# Patient Record
Sex: FEMALE | Race: WHITE | NOT HISPANIC OR LATINO | Employment: FULL TIME | URBAN - METROPOLITAN AREA
[De-identification: names, ages, dates, MRNs, and addresses within clinical notes are randomized per-mention and may not be internally consistent; named-entity substitution may affect disease eponyms.]

---

## 2018-07-03 ENCOUNTER — OFFICE VISIT (OUTPATIENT)
Dept: FAMILY MEDICINE | Facility: CLINIC | Age: 28
End: 2018-07-03
Payer: COMMERCIAL

## 2018-07-03 VITALS
OXYGEN SATURATION: 99 % | WEIGHT: 179 LBS | HEART RATE: 87 BPM | DIASTOLIC BLOOD PRESSURE: 76 MMHG | HEIGHT: 69 IN | SYSTOLIC BLOOD PRESSURE: 122 MMHG | BODY MASS INDEX: 26.51 KG/M2 | TEMPERATURE: 98.5 F

## 2018-07-03 DIAGNOSIS — Z12.4 CERVICAL CANCER SCREENING: ICD-10-CM

## 2018-07-03 DIAGNOSIS — R35.0 URINARY FREQUENCY: ICD-10-CM

## 2018-07-03 DIAGNOSIS — Z00.01 ENCOUNTER FOR PREVENTATIVE ADULT HEALTH CARE EXAM WITH ABNORMAL FINDINGS: Primary | ICD-10-CM

## 2018-07-03 DIAGNOSIS — B49 FUNGAL INFECTION: ICD-10-CM

## 2018-07-03 DIAGNOSIS — Z30.011 ENCOUNTER FOR INITIAL PRESCRIPTION OF CONTRACEPTIVE PILLS: ICD-10-CM

## 2018-07-03 DIAGNOSIS — R10.13 EPIGASTRIC PAIN: ICD-10-CM

## 2018-07-03 DIAGNOSIS — L30.9 ECZEMA, UNSPECIFIED TYPE: ICD-10-CM

## 2018-07-03 LAB
ALBUMIN SERPL-MCNC: 3.5 G/DL (ref 3.4–5)
ALBUMIN UR-MCNC: NEGATIVE MG/DL
ALP SERPL-CCNC: 79 U/L (ref 40–150)
ALT SERPL W P-5'-P-CCNC: 22 U/L (ref 0–50)
ANION GAP SERPL CALCULATED.3IONS-SCNC: 9 MMOL/L (ref 3–14)
APPEARANCE UR: CLEAR
AST SERPL W P-5'-P-CCNC: 21 U/L (ref 0–45)
BILIRUB SERPL-MCNC: 0.2 MG/DL (ref 0.2–1.3)
BILIRUB UR QL STRIP: NEGATIVE
BUN SERPL-MCNC: 10 MG/DL (ref 7–30)
CALCIUM SERPL-MCNC: 9 MG/DL (ref 8.5–10.1)
CHLORIDE SERPL-SCNC: 105 MMOL/L (ref 94–109)
CO2 SERPL-SCNC: 26 MMOL/L (ref 20–32)
COLOR UR AUTO: YELLOW
CREAT SERPL-MCNC: 0.67 MG/DL (ref 0.52–1.04)
ERYTHROCYTE [DISTWIDTH] IN BLOOD BY AUTOMATED COUNT: 12.3 % (ref 10–15)
GFR SERPL CREATININE-BSD FRML MDRD: >90 ML/MIN/1.7M2
GLUCOSE SERPL-MCNC: 75 MG/DL (ref 70–99)
GLUCOSE UR STRIP-MCNC: NEGATIVE MG/DL
HBA1C MFR BLD: 5.1 % (ref 0–5.6)
HCT VFR BLD AUTO: 40.3 % (ref 35–47)
HGB BLD-MCNC: 13.7 G/DL (ref 11.7–15.7)
HGB UR QL STRIP: NEGATIVE
KETONES UR STRIP-MCNC: NEGATIVE MG/DL
LEUKOCYTE ESTERASE UR QL STRIP: NEGATIVE
LIPASE SERPL-CCNC: 129 U/L (ref 73–393)
MCH RBC QN AUTO: 29.5 PG (ref 26.5–33)
MCHC RBC AUTO-ENTMCNC: 34 G/DL (ref 31.5–36.5)
MCV RBC AUTO: 87 FL (ref 78–100)
NITRATE UR QL: NEGATIVE
PH UR STRIP: 7 PH (ref 5–7)
PLATELET # BLD AUTO: 339 10E9/L (ref 150–450)
POTASSIUM SERPL-SCNC: 3.9 MMOL/L (ref 3.4–5.3)
PROT SERPL-MCNC: 7.8 G/DL (ref 6.8–8.8)
RBC # BLD AUTO: 4.64 10E12/L (ref 3.8–5.2)
SODIUM SERPL-SCNC: 140 MMOL/L (ref 133–144)
SOURCE: NORMAL
SP GR UR STRIP: <=1.005 (ref 1–1.03)
UROBILINOGEN UR STRIP-ACNC: 0.2 EU/DL (ref 0.2–1)
WBC # BLD AUTO: 5.9 10E9/L (ref 4–11)

## 2018-07-03 PROCEDURE — 83690 ASSAY OF LIPASE: CPT | Performed by: NURSE PRACTITIONER

## 2018-07-03 PROCEDURE — 99213 OFFICE O/P EST LOW 20 MIN: CPT | Mod: 25 | Performed by: NURSE PRACTITIONER

## 2018-07-03 PROCEDURE — 99385 PREV VISIT NEW AGE 18-39: CPT | Performed by: NURSE PRACTITIONER

## 2018-07-03 PROCEDURE — 85027 COMPLETE CBC AUTOMATED: CPT | Performed by: NURSE PRACTITIONER

## 2018-07-03 PROCEDURE — 80053 COMPREHEN METABOLIC PANEL: CPT | Performed by: NURSE PRACTITIONER

## 2018-07-03 PROCEDURE — G0145 SCR C/V CYTO,THINLAYER,RESCR: HCPCS | Performed by: NURSE PRACTITIONER

## 2018-07-03 PROCEDURE — 81003 URINALYSIS AUTO W/O SCOPE: CPT | Performed by: NURSE PRACTITIONER

## 2018-07-03 PROCEDURE — 83036 HEMOGLOBIN GLYCOSYLATED A1C: CPT | Performed by: NURSE PRACTITIONER

## 2018-07-03 PROCEDURE — 36415 COLL VENOUS BLD VENIPUNCTURE: CPT | Performed by: NURSE PRACTITIONER

## 2018-07-03 RX ORDER — KETOCONAZOLE 20 MG/G
CREAM TOPICAL DAILY
Qty: 60 G | Refills: 1 | Status: SHIPPED | OUTPATIENT
Start: 2018-07-03 | End: 2019-10-21

## 2018-07-03 RX ORDER — KETOCONAZOLE 20 MG/G
CREAM TOPICAL DAILY
COMMUNITY
End: 2018-07-03

## 2018-07-03 RX ORDER — CLOTRIMAZOLE AND BETAMETHASONE DIPROPIONATE 10; .64 MG/G; MG/G
CREAM TOPICAL 2 TIMES DAILY
Qty: 30 G | Refills: 1 | Status: SHIPPED | OUTPATIENT
Start: 2018-07-03 | End: 2019-10-21

## 2018-07-03 RX ORDER — NORGESTIMATE AND ETHINYL ESTRADIOL 0.25-0.035
1 KIT ORAL DAILY
Qty: 84 TABLET | Refills: 3 | Status: SHIPPED | OUTPATIENT
Start: 2018-07-03 | End: 2019-06-06

## 2018-07-03 RX ORDER — NORGESTIMATE AND ETHINYL ESTRADIOL 0.25-0.035
1 KIT ORAL DAILY
COMMUNITY
End: 2018-07-03

## 2018-07-03 ASSESSMENT — PAIN SCALES - GENERAL: PAINLEVEL: NO PAIN (0)

## 2018-07-03 NOTE — PROGRESS NOTES
SUBJECTIVE:   CC: Adina Clark is an 28 year old woman who presents for preventive health visit.     Physical   Annual:     Getting at least 3 servings of Calcium per day:  Yes    Bi-annual eye exam:  NO    Dental care twice a year:  NO    Sleep apnea or symptoms of sleep apnea:  None    Diet:  Regular (no restrictions)    Frequency of exercise:  2-3 days/week    Duration of exercise:  30-45 minutes    Taking medications regularly:  Yes    Medication side effects:  None    Additional concerns today:  YES    She left urine sample  Denies dysuria  Intermittent frequency. Sometimes with urgency  Denies polydipsia    Sharp pain in epigastric area  Feels full  Denies heart burn, indigestion, change to BM  Rare alcohol use  Not always worsened by oral intake  Denies weight loss, nausea, vomiting    She is interested in other forms of birth control, IUD  She is in a monogamous relationship with 1 partner of several years  Declines STD screening  No history PID, cervicitis    Today's PHQ-2 Score:   PHQ-2 ( 1999 [employer]) 7/3/2018   Q1: Little interest or pleasure in doing things 0   Q2: Feeling down, depressed or hopeless 0   PHQ-2 Score 0   Q1: Little interest or pleasure in doing things Not at all   Q2: Feeling down, depressed or hopeless Not at all   PHQ-2 Score 0       Abuse: Current or Past(Physical, Sexual or Emotional)- No  Do you feel safe in your environment - Yes    Social History   Substance Use Topics     Smoking status: Never Smoker     Smokeless tobacco: Never Used     Alcohol use Yes      Comment: occ.     Alcohol Use 7/3/2018   If you drink alcohol do you typically have greater than 3 drinks per day OR greater than 7 drinks per week? No       Reviewed orders with patient.  Reviewed health maintenance and updated orders accordingly - Yes  Labs reviewed in EPIC  BP Readings from Last 3 Encounters:   07/03/18 122/76    Wt Readings from Last 3 Encounters:   07/03/18 179 lb (81.2 kg)                 "  There is no problem list on file for this patient.    History reviewed. No pertinent surgical history.    Social History   Substance Use Topics     Smoking status: Never Smoker     Smokeless tobacco: Never Used     Alcohol use Yes      Comment: occ.     Family History   Problem Relation Age of Onset     Hypertension Mother            Mammogram not appropriate for this patient based on age.    Pertinent mammograms are reviewed under the imaging tab.  History of abnormal Pap smear: NO - age 21-29 PAP every 3 years recommended     Reviewed and updated as needed this visit by clinical staff  Tobacco  Allergies  Meds  Med Hx  Surg Hx  Fam Hx  Soc Hx        Reviewed and updated as needed this visit by Provider        History reviewed. No pertinent past medical history.     Review of Systems  CONSTITUTIONAL: NEGATIVE for fever, chills, change in weight  INTEGUMENTARU/SKIN: NEGATIVE for worrisome rashes, moles or lesions  EYES: NEGATIVE for vision changes or irritation  ENT: NEGATIVE for ear, mouth and throat problems  RESP: NEGATIVE for significant cough or SOB  BREAST: NEGATIVE for masses, tenderness or discharge  CV: NEGATIVE for chest pain, palpitations or peripheral edema  GI: see HPI  : NEGATIVE for unusual urinary or vaginal symptoms. Periods are regular.  MUSCULOSKELETAL: NEGATIVE for significant arthralgias or myalgia  NEURO: NEGATIVE for weakness, dizziness or paresthesias  PSYCHIATRIC: NEGATIVE for changes in mood or affect     OBJECTIVE:   /76 (BP Location: Right arm, Patient Position: Chair, Cuff Size: Adult Regular)  Pulse 87  Temp 98.5  F (36.9  C) (Oral)  Ht 5' 8.5\" (1.74 m)  Wt 179 lb (81.2 kg)  LMP 06/05/2018  SpO2 99%  Breastfeeding? No  BMI 26.82 kg/m2  Physical Exam  GENERAL: healthy, alert and no distress  EYES: Eyes grossly normal to inspection, PERRL and conjunctivae and sclerae normal  HENT: ear canals and TM's normal, nose and mouth without ulcers or lesions  NECK: no " adenopathy, no asymmetry, masses, or scars and thyroid normal to palpation  RESP: lungs clear to auscultation - no rales, rhonchi or wheezes  BREAST: normal without masses, tenderness or nipple discharge and no palpable axillary masses or adenopathy  CV: regular rate and rhythm, normal S1 S2, no S3 or S4, no murmur, click or rub, no peripheral edema and peripheral pulses strong  ABDOMEN: soft, nontender, no hepatosplenomegaly, no masses and bowel sounds normal   (female): normal female external genitalia, normal urethral meatus, vaginal mucosa pink, moist, well rugated, and normal cervix/adnexa/uterus without masses or discharge  MS: no gross musculoskeletal defects noted, no edema  SKIN: 2 annual erythematous lesions with hyperpigmented borders right breast  NEURO: Normal strength and tone, mentation intact and speech normal  PSYCH: mentation appears normal, affect normal/bright    Diagnostic Test Results:  Results for orders placed or performed in visit on 07/03/18 (from the past 24 hour(s))   UA reflex to Microscopic and Culture   Result Value Ref Range    Color Urine Yellow     Appearance Urine Clear     Glucose Urine Negative NEG^Negative mg/dL    Bilirubin Urine Negative NEG^Negative    Ketones Urine Negative NEG^Negative mg/dL    Specific Gravity Urine <=1.005 1.003 - 1.035    Blood Urine Negative NEG^Negative    pH Urine 7.0 5.0 - 7.0 pH    Protein Albumin Urine Negative NEG^Negative mg/dL    Urobilinogen Urine 0.2 0.2 - 1.0 EU/dL    Nitrite Urine Negative NEG^Negative    Leukocyte Esterase Urine Negative NEG^Negative    Source Midstream Urine    CBC with platelets   Result Value Ref Range    WBC 5.9 4.0 - 11.0 10e9/L    RBC Count 4.64 3.8 - 5.2 10e12/L    Hemoglobin 13.7 11.7 - 15.7 g/dL    Hematocrit 40.3 35.0 - 47.0 %    MCV 87 78 - 100 fl    MCH 29.5 26.5 - 33.0 pg    MCHC 34.0 31.5 - 36.5 g/dL    RDW 12.3 10.0 - 15.0 %    Platelet Count 339 150 - 450 10e9/L   Hemoglobin A1c   Result Value Ref Range     "Hemoglobin A1C 5.1 0 - 5.6 %       ASSESSMENT/PLAN:   1. Encounter for preventative adult health care exam with abnormal findings    2. Urinary frequency  - Not typical symptoms of infection.  Consider diabetes, diuretic effect from coffee  - UA reflex to Microscopic and Culture  - Hemoglobin A1c    3. Epigastric pain  - Physical exam unremarkable. VSS. Follow up pending lab work  - Comprehensive metabolic panel (BMP + Alb, Alk Phos, ALT, AST, Total. Bili, TP)  - CBC with platelets  - Lipase  - H Pylori antigen, stool; Future    4. Cervical cancer screening  - PAP imaged thin layer screen    5. Fungal infection  - clotrimazole-betamethasone (LOTRISONE) cream; Apply topically 2 times daily  Dispense: 30 g; Refill: 1    6. Eczema, unspecified type  - Refill per patient request  - ketoconazole (NIZORAL) 2 % cream; Apply topically daily  Dispense: 60 g; Refill: 1    7. Encounter for initial prescription of contraceptive pills  - We discussed LARCs including risks and benefits. Advised that cramping and irregular spotting are common after insertion but after 6 months most women have menorrhea. IUD would be category 1 for her. Refilled COCs and advised to schedule with Dr. Engelmann for insertion. I also gave her additional materials on birth control options  - norgestimate-ethinyl estradiol (ORTHO-CYCLEN, SPRINTEC) 0.25-35 MG-MCG per tablet; Take 1 tablet by mouth daily  Dispense: 84 tablet; Refill: 3    COUNSELING:  Reviewed preventive health counseling, as reflected in patient instructions       Regular exercise       Healthy diet/nutrition       Contraception       Family planning    BP Readings from Last 1 Encounters:   07/03/18 122/76     Estimated body mass index is 26.82 kg/(m^2) as calculated from the following:    Height as of this encounter: 5' 8.5\" (1.74 m).    Weight as of this encounter: 179 lb (81.2 kg).           reports that she has never smoked. She has never used smokeless tobacco.      Counseling " Resources:  ATP IV Guidelines  Pooled Cohorts Equation Calculator  Breast Cancer Risk Calculator  FRAX Risk Assessment  ICSI Preventive Guidelines  Dietary Guidelines for Americans, 2010  USDA's MyPlate  ASA Prophylaxis  Lung CA Screening    SHARRON Buenrostro CNP  [facility]  Answers for HPI/ROS submitted by the patient on 7/3/2018   PHQ-2 Score: 0

## 2018-07-03 NOTE — MR AVS SNAPSHOT
After Visit Summary   7/3/2018    Adina Clark    MRN: [id number]           Patient Information     Date Of Birth          1990        Visit Information        Provider Department      7/3/2018 8:40 AM Ca Spence APRN [facility]        Today's Diagnoses     Encounter for preventative adult health care exam with abnormal findings    -  1    Urinary frequency        Epigastric pain        Cervical cancer screening        Fungal infection        Eczema, unspecified type        Encounter for initial prescription of contraceptive pills          Care Instructions    You can look into Nexplanon and Mirena IUD online  If you are interested in either of these, you can schedule with Dr. Engelmann for insertion  We also have midwives that come on Thursday and you could schedule with them    Preventive Health Recommendations  Female Ages 26 - 39  Yearly exam:   See your health care provider every year in order to    Review health changes.     Discuss preventive care.      Review your medicines if you your doctor has prescribed any.    Until age 30: Get a Pap test every three years (more often if you have had an abnormal result).    After age 30: Talk to your doctor about whether you should have a Pap test every 3 years or have a Pap test with HPV screening every 5 years.   You do not need a Pap test if your uterus was removed (hysterectomy) and you have not had cancer.  You should be tested each year for STDs (sexually transmitted diseases), if you're at risk.   Talk to your provider about how often to have your cholesterol checked.  If you are at risk for diabetes, you should have a diabetes test (fasting glucose).  Shots: Get a flu shot each year. Get a tetanus shot every 10 years.   Nutrition:     Eat at least 5 servings of fruits and vegetables each day.    Eat whole-grain bread, whole-wheat pasta and brown rice instead of white grains and rice.    Get  "adequate Calcium and Vitamin D.     Lifestyle    Exercise at least 150 minutes a week (30 minutes a day, 5 days of the week). This will help you control your weight and prevent disease.    Limit alcohol to one drink per day.    No smoking.     Wear sunscreen to prevent skin cancer.    See your dentist every six months for an exam and cleaning.            Follow-ups after your visit        Future tests that were ordered for you today     Open Future Orders        Priority Expected Expires Ordered    H Pylori antigen, stool Routine  8/2/2018 7/3/2018            Who to contact     If you have questions or need follow up information about today's clinic visit or your schedule please contact [facility] directly at [phone].  Normal or non-critical lab and imaging results will be communicated to you by MyChart, letter or phone within 4 business days after the clinic has received the results. If you do not hear from us within 7 days, please contact the clinic through [employer] or phone. If you have a critical or abnormal lab result, we will notify you by phone as soon as possible.  Submit refill requests through [employer] or call your pharmacy and they will forward the refill request to us. Please allow 3 business days for your refill to be completed.          Additional Information About Your Visit        [employer] Information     [employer] lets you send messages to your doctor, view your test results, renew your prescriptions, schedule appointments and more. To sign up, go to [email or web address] . Click on \"Log in\" on the left side of the screen, which will take you to the Welcome page. Then click on \"Sign up Now\" on the right side of the page.     You will be asked to enter the access code listed below, as well as some personal information. Please follow the directions to create your username and password.     Your access code is: [email or web address]  Expires: 10/1/2018  8:33 AM     Your access code will " " in 90 days. If you need help or a new code, please call your [facility] or [phone].        Care EveryWhere ID     This is your Care EveryWhere ID. This could be used by other organizations to access your [place] medical records  [id number]        Your Vitals Were     Pulse Temperature Height Last Period Pulse Oximetry Breastfeeding?    87 98.5  F (36.9  C) (Oral) 5' 8.5\" (1.74 m) 2018 99% No    BMI (Body Mass Index)                   26.82 kg/m2            Blood Pressure from Last 3 Encounters:   18 122/76    Weight from Last 3 Encounters:   18 179 lb (81.2 kg)              We Performed the Following     CBC with platelets     Comprehensive metabolic panel (BMP + Alb, Alk Phos, ALT, AST, Total. Bili, TP)     Hemoglobin A1c     Lipase     PAP imaged thin layer screen     UA reflex to Microscopic and Culture          Today's Medication Changes          These changes are accurate as of 7/3/18  9:21 AM.  If you have any questions, ask your nurse or doctor.               Start taking these medicines.        Dose/Directions    clotrimazole-betamethasone cream   Commonly known as:  LOTRISONE   Used for:  Fungal infection   Started by:  Ca Spence APRN CNP        Apply topically 2 times daily   Quantity:  30 g   Refills:  1            Where to get your medicines      These medications were sent to [facility] [address] [place], [place] - [address]  [address] [address]     Phone:  [phone]     clotrimazole-betamethasone cream    ketoconazole 2 % cream    norgestimate-ethinyl estradiol 0.25-35 MG-MCG per tablet                Primary Care Provider Office Phone # Fax #    [facility] [phone]       [address] [place] [address]        Equal Access to Services     SHARYN RICE AH: Rajesh Colon, kae green, qatash arreola, ada brandt " letitia salinas ah. So [facility] [phone].    ATENCIÓN: Si habla rona, tiene a doss disposición servicios gratuitos de asistencia lingüística. [place] calixto [phone].    We comply with applicable federal civil rights laws and [place] laws. We do not discriminate on the basis of race, color, national origin, age, disability, sex, sexual orientation, or gender identity.            Thank you!     Thank you for choosing [facility]  for your care. Our goal is always to provide you with excellent care. Hearing back from our patients is one way we can continue to improve our services. Please take a few minutes to complete the written survey that you may receive in the mail after your visit with us. Thank you!             Your Updated Medication List - Protect others around you: Learn how to safely use, store and throw away your medicines at www.disposemymeds.org.          This list is accurate as of 7/3/18  9:21 AM.  Always use your most recent med list.                   Brand Name Dispense Instructions for use Diagnosis    clotrimazole-betamethasone cream    LOTRISONE    30 g    Apply topically 2 times daily    Fungal infection       ketoconazole 2 % cream    NIZORAL    60 g    Apply topically daily    Eczema, unspecified type       norgestimate-ethinyl estradiol 0.25-35 MG-MCG per tablet    ORTHO-CYCLEN, SPRINTEC    84 tablet    Take 1 tablet by mouth daily    Encounter for initial prescription of contraceptive pills

## 2018-07-03 NOTE — PATIENT INSTRUCTIONS
You can look into Nexplanon and Mirena IUD online  If you are interested in either of these, you can schedule with Dr. Engelmann for insertion  We also have midwives that come on Thursday and you could schedule with them    Preventive Health Recommendations  Female Ages 26 - 39  Yearly exam:   See your health care provider every year in order to    Review health changes.     Discuss preventive care.      Review your medicines if you your doctor has prescribed any.    Until age 30: Get a Pap test every three years (more often if you have had an abnormal result).    After age 30: Talk to your doctor about whether you should have a Pap test every 3 years or have a Pap test with HPV screening every 5 years.   You do not need a Pap test if your uterus was removed (hysterectomy) and you have not had cancer.  You should be tested each year for STDs (sexually transmitted diseases), if you're at risk.   Talk to your provider about how often to have your cholesterol checked.  If you are at risk for diabetes, you should have a diabetes test (fasting glucose).  Shots: Get a flu shot each year. Get a tetanus shot every 10 years.   Nutrition:     Eat at least 5 servings of fruits and vegetables each day.    Eat whole-grain bread, whole-wheat pasta and brown rice instead of white grains and rice.    Get adequate Calcium and Vitamin D.     Lifestyle    Exercise at least 150 minutes a week (30 minutes a day, 5 days of the week). This will help you control your weight and prevent disease.    Limit alcohol to one drink per day.    No smoking.     Wear sunscreen to prevent skin cancer.    See your dentist every six months for an exam and cleaning.

## 2018-07-05 DIAGNOSIS — R10.13 EPIGASTRIC PAIN: ICD-10-CM

## 2018-07-05 PROCEDURE — 87338 HPYLORI STOOL AG IA: CPT | Performed by: NURSE PRACTITIONER

## 2018-07-06 LAB
COPATH REPORT: NORMAL
PAP: NORMAL

## 2018-07-09 LAB
H PYLORI AG STL QL IA: NORMAL
SPECIMEN SOURCE: NORMAL

## 2018-07-10 NOTE — PROGRESS NOTES
Adina Clark,    Your lab results have been released to [employer].   Testing for H pylori was negative.   Please call the clinic if you have any concerns [phone].    SHARRON Buenrostro [facility]

## 2018-07-16 NOTE — PROGRESS NOTES
Adina Clark,    Your lab results have been released to [employer].   Your labs look good.   Please call the clinic if you have any concerns [phone].    SHARRON Buenrostro [facility]

## 2019-03-01 ENCOUNTER — TELEPHONE (OUTPATIENT)
Dept: FAMILY MEDICINE | Facility: CLINIC | Age: 29
End: 2019-03-01

## 2019-03-01 DIAGNOSIS — B00.9 HERPES SIMPLEX VIRUS INFECTION: Primary | ICD-10-CM

## 2019-03-01 RX ORDER — VALACYCLOVIR HYDROCHLORIDE 500 MG/1
500 TABLET, FILM COATED ORAL 2 TIMES DAILY
Qty: 6 TABLET | Refills: 3 | Status: SHIPPED | OUTPATIENT
Start: 2019-03-01 | End: 2019-10-21

## 2019-03-01 NOTE — TELEPHONE ENCOUNTER
Reason for Call:  Other / Herpes    Detailed comments: Patient called and stated that sometime ago she tested positive for herpes and the doctor that was treating her mentioned that if she ever felt any outbreak in the future she would be able to give her some medication that would help right away.  Patient is requesting a call back to discuss the possibility of being prescribed some medication for herpes without having to come in.  Please call patient back to discuss how to follow up.    Phone Number Patient can be reached at: Cell number on file:    Telephone Information:   Mobile [phone]     Best Time: Anytime    Can we leave a detailed message on this number? YES    Call taken on 3/1/2019 at 8:38 AM by Melissa Hebert

## 2019-03-01 NOTE — TELEPHONE ENCOUNTER
Patient is requesting Herpes medication without a visit.  Doesn't appear there are notes regarding herpes in patient's chart.  Treat via phone or would you prefer a visit?  Thank you.  Ifrah Miguel RN

## 2019-03-01 NOTE — TELEPHONE ENCOUNTER
I spoke with her  Tested positive HSV outside of [place]  Has been treated before  Rx sent with refills for future outbreak

## 2019-06-06 DIAGNOSIS — Z30.011 ENCOUNTER FOR INITIAL PRESCRIPTION OF CONTRACEPTIVE PILLS: ICD-10-CM

## 2019-06-07 RX ORDER — NORGESTIMATE AND ETHINYL ESTRADIOL 0.25-0.035
KIT ORAL
Qty: 84 TABLET | Refills: 0 | Status: SHIPPED | OUTPATIENT
Start: 2019-06-07 | End: 2019-08-27

## 2019-06-07 NOTE — TELEPHONE ENCOUNTER
Prescription approved per [facility] Refill Protocol.  Ifrah Miguel RN

## 2019-08-27 DIAGNOSIS — Z30.011 ENCOUNTER FOR INITIAL PRESCRIPTION OF CONTRACEPTIVE PILLS: ICD-10-CM

## 2019-08-27 NOTE — TELEPHONE ENCOUNTER
Janet given x1 and patient did not follow up, please call patient to schedule an appointment and then to PCP to advise.    Carmela Mas, FLAVIO [facility] Triage.

## 2019-08-27 NOTE — TELEPHONE ENCOUNTER
"Requested Prescriptions   Pending Prescriptions Disp Refills     SPRINTEC 28 0.25-35 MG-MCG tablet [Pharmacy Med Name: SPRINTEC 28 DAY TABLET] 84 tablet 0     Sig: TAKE 1 TABLET BY MOUTH EVERY DAY   Last Written Prescription Date:  6/7/19  Last Fill Quantity: 84,  # refills: 0   Last office visit: 7/3/2018 with prescribing provider:     Future Office Visit:        Contraceptives Protocol Failed - 8/27/2019  1:20 AM        Failed - Recent (12 mo) or future (30 days) visit within the authorizing provider's specialty     Patient had office visit in the last 12 months or has a visit in the next 30 days with authorizing provider or within the authorizing provider's specialty.  See \"Patient Info\" tab in inbasket, or \"Choose Columns\" in Meds & Orders section of the refill encounter.              Passed - Patient is not a current smoker if age is 35 or older        Passed - Medication is active on med list        Passed - No active pregnancy on record        Passed - No positive pregnancy test in past 12 months          "

## 2019-08-28 NOTE — TELEPHONE ENCOUNTER
Message left for patient to return call to TC line to discuss message below. Patient overdue for annual physical.

## 2019-08-30 RX ORDER — NORGESTIMATE AND ETHINYL ESTRADIOL 0.25-0.035
KIT ORAL
Qty: 28 TABLET | Refills: 0 | Status: SHIPPED | OUTPATIENT
Start: 2019-08-30 | End: 2019-10-21

## 2019-08-30 NOTE — TELEPHONE ENCOUNTER
Routed to [facility] to address or refuse refill.    Routing refill request to provider for review/approval because:  Janet given x1 and patient did not follow up, please advise    A letter has been mailed at this time as patient not responding to phone messages.    Dayanna Peterson RN  [facility]

## 2019-08-30 NOTE — TELEPHONE ENCOUNTER
Message left for patient to return call to TC line regarding message below. Letter printed and mailed to patient's home address.     Flagging for RN to unattach medication then sign/close encounter.

## 2019-09-04 NOTE — TELEPHONE ENCOUNTER
Looks like medication was refilled x 1.    Patient needs OV.  See below.  Carmela Mas, RN [facility] Triage.

## 2019-10-19 ASSESSMENT — ENCOUNTER SYMPTOMS
FREQUENCY: 0
HEMATURIA: 0
MYALGIAS: 0
CONSTIPATION: 0
COUGH: 0
SHORTNESS OF BREATH: 0
EYE PAIN: 0
DIARRHEA: 0
DYSURIA: 0
PALPITATIONS: 0
DIZZINESS: 0
FEVER: 0
ARTHRALGIAS: 0
ABDOMINAL PAIN: 0
PARESTHESIAS: 0
HEARTBURN: 0
WEAKNESS: 0
HEADACHES: 0
BREAST MASS: 0
NAUSEA: 0
HEMATOCHEZIA: 0
SORE THROAT: 0
NERVOUS/ANXIOUS: 0
CHILLS: 0
JOINT SWELLING: 0

## 2019-10-21 ENCOUNTER — OFFICE VISIT (OUTPATIENT)
Dept: FAMILY MEDICINE | Facility: CLINIC | Age: 29
End: 2019-10-21
Payer: COMMERCIAL

## 2019-10-21 VITALS
DIASTOLIC BLOOD PRESSURE: 70 MMHG | OXYGEN SATURATION: 99 % | HEIGHT: 69 IN | HEART RATE: 94 BPM | BODY MASS INDEX: 25.33 KG/M2 | TEMPERATURE: 97.7 F | SYSTOLIC BLOOD PRESSURE: 118 MMHG | WEIGHT: 171 LBS

## 2019-10-21 DIAGNOSIS — Z30.011 ENCOUNTER FOR INITIAL PRESCRIPTION OF CONTRACEPTIVE PILLS: ICD-10-CM

## 2019-10-21 DIAGNOSIS — B49 FUNGAL INFECTION: ICD-10-CM

## 2019-10-21 DIAGNOSIS — B00.9 HERPES SIMPLEX VIRUS INFECTION: ICD-10-CM

## 2019-10-21 DIAGNOSIS — L30.9 ECZEMA, UNSPECIFIED TYPE: ICD-10-CM

## 2019-10-21 DIAGNOSIS — Z00.00 ROUTINE GENERAL MEDICAL EXAMINATION AT A HEALTH CARE FACILITY: Primary | ICD-10-CM

## 2019-10-21 LAB
ANION GAP SERPL CALCULATED.3IONS-SCNC: 6 MMOL/L (ref 3–14)
BUN SERPL-MCNC: 10 MG/DL (ref 7–30)
CALCIUM SERPL-MCNC: 9 MG/DL (ref 8.5–10.1)
CHLORIDE SERPL-SCNC: 106 MMOL/L (ref 94–109)
CHOLEST SERPL-MCNC: 187 MG/DL
CO2 SERPL-SCNC: 27 MMOL/L (ref 20–32)
CREAT SERPL-MCNC: 0.64 MG/DL (ref 0.52–1.04)
GFR SERPL CREATININE-BSD FRML MDRD: >90 ML/MIN/{1.73_M2}
GLUCOSE SERPL-MCNC: 85 MG/DL (ref 70–99)
HDLC SERPL-MCNC: 66 MG/DL
LDLC SERPL CALC-MCNC: 99 MG/DL
NONHDLC SERPL-MCNC: 121 MG/DL
POTASSIUM SERPL-SCNC: 4 MMOL/L (ref 3.4–5.3)
SODIUM SERPL-SCNC: 139 MMOL/L (ref 133–144)
TRIGL SERPL-MCNC: 110 MG/DL

## 2019-10-21 PROCEDURE — 80061 LIPID PANEL: CPT | Performed by: NURSE PRACTITIONER

## 2019-10-21 PROCEDURE — 36415 COLL VENOUS BLD VENIPUNCTURE: CPT | Performed by: NURSE PRACTITIONER

## 2019-10-21 PROCEDURE — 99395 PREV VISIT EST AGE 18-39: CPT | Performed by: NURSE PRACTITIONER

## 2019-10-21 PROCEDURE — 80048 BASIC METABOLIC PNL TOTAL CA: CPT | Performed by: NURSE PRACTITIONER

## 2019-10-21 RX ORDER — KETOCONAZOLE 20 MG/G
CREAM TOPICAL DAILY
Qty: 60 G | Refills: 1 | Status: SHIPPED | OUTPATIENT
Start: 2019-10-21 | End: 2021-07-01

## 2019-10-21 RX ORDER — VALACYCLOVIR HYDROCHLORIDE 500 MG/1
500 TABLET, FILM COATED ORAL 2 TIMES DAILY
Qty: 6 TABLET | Refills: 3 | Status: SHIPPED | OUTPATIENT
Start: 2019-10-21 | End: 2021-07-01

## 2019-10-21 RX ORDER — CLOTRIMAZOLE AND BETAMETHASONE DIPROPIONATE 10; .64 MG/G; MG/G
CREAM TOPICAL 2 TIMES DAILY
Qty: 30 G | Refills: 1 | Status: SHIPPED | OUTPATIENT
Start: 2019-10-21 | End: 2021-07-01

## 2019-10-21 RX ORDER — NORGESTIMATE AND ETHINYL ESTRADIOL 0.25-0.035
1 KIT ORAL DAILY
Qty: 84 TABLET | Refills: 3 | Status: SHIPPED | OUTPATIENT
Start: 2019-10-21 | End: 2021-04-02

## 2019-10-21 ASSESSMENT — ENCOUNTER SYMPTOMS
SHORTNESS OF BREATH: 0
COUGH: 0
HEADACHES: 0
CHILLS: 0
HEMATURIA: 0
FEVER: 0
BREAST MASS: 0
WEAKNESS: 0
CONSTIPATION: 0
ABDOMINAL PAIN: 0
DIZZINESS: 0
DYSURIA: 0
PARESTHESIAS: 0
NAUSEA: 0
FREQUENCY: 0
EYE PAIN: 0
DIARRHEA: 0
HEMATOCHEZIA: 0
SORE THROAT: 0
MYALGIAS: 0
PALPITATIONS: 0
HEARTBURN: 0
ARTHRALGIAS: 0
NERVOUS/ANXIOUS: 0
JOINT SWELLING: 0

## 2019-10-21 ASSESSMENT — PAIN SCALES - GENERAL: PAINLEVEL: NO PAIN (0)

## 2019-10-21 ASSESSMENT — MIFFLIN-ST. JEOR: SCORE: 1567.09

## 2019-10-21 NOTE — PROGRESS NOTES
SUBJECTIVE:   CC: Adina Clark is an 29 year old woman who presents for preventive health visit.     Healthy Habits:     Getting at least 3 servings of Calcium per day:  Yes    Bi-annual eye exam:  NO    Dental care twice a year:  Yes    Sleep apnea or symptoms of sleep apnea:  None    Diet:  Regular (no restrictions)    Frequency of exercise:  1 day/week    Duration of exercise:  30-45 minutes    Taking medications regularly:  Yes    Medication side effects:  None    PHQ-2 Total Score: 0    Additional concerns today:  No    Pap 2018 - NIL  BP elevated today which she attributes to stress  Her mom is in the hospital. She had brain aneurysm rupture last month. Then GI issues developed during surgery. She is recovering. Slowly  Needs refill of COCs. She thinks about getting an IUD but is also comfortable with the pill  Declines STD screening  Last year at her annual exam she had GI complaints. Labs normal. She has since reduced gluten in her diet and they have resolved        Today's PHQ-2 Score:   PHQ-2 ( 1999 Pfizer) 10/19/2019   Q1: Little interest or pleasure in doing things 0   Q2: Feeling down, depressed or hopeless 0   PHQ-2 Score 0   Q1: Little interest or pleasure in doing things Not at all   Q2: Feeling down, depressed or hopeless Not at all   PHQ-2 Score 0       Abuse: Current or Past(Physical, Sexual or Emotional)- No  Do you feel safe in your environment? Yes    Social History     Tobacco Use     Smoking status: Never Smoker     Smokeless tobacco: Never Used   Substance Use Topics     Alcohol use: Yes     Comment: occ.     If you drink alcohol do you typically have >3 drinks per day or >7 drinks per week? No    Alcohol Use 10/19/2019   Prescreen: >3 drinks/day or >7 drinks/week? No       Reviewed orders with patient.  Reviewed health maintenance and updated orders accordingly - Yes  Lab work is in process  Labs reviewed in EPIC  BP Readings from Last 3 Encounters:   10/21/19 118/70    07/03/18 122/76    Wt Readings from Last 3 Encounters:   10/21/19 77.6 kg (171 lb)   07/03/18 81.2 kg (179 lb)                  There is no problem list on file for this patient.    History reviewed. No pertinent surgical history.    Social History     Tobacco Use     Smoking status: Never Smoker     Smokeless tobacco: Never Used   Substance Use Topics     Alcohol use: Yes     Comment: occ.     Family History   Problem Relation Age of Onset     Hypertension Mother          Current Outpatient Medications   Medication Sig Dispense Refill     clotrimazole-betamethasone (LOTRISONE) 1-0.05 % external cream Apply topically 2 times daily 30 g 1     ketoconazole (NIZORAL) 2 % external cream Apply topically daily 60 g 1     norgestimate-ethinyl estradiol (SPRINTEC 28) 0.25-35 MG-MCG tablet Take 1 tablet by mouth daily 84 tablet 3     valACYclovir (VALTREX) 500 MG tablet Take 1 tablet (500 mg) by mouth 2 times daily 6 tablet 3       Mammogram not appropriate for this patient based on age.    Pertinent mammograms are reviewed under the imaging tab.  History of abnormal Pap smear: NO - age 21-29 PAP every 3 years recommended  PAP / HPV 7/3/2018   PAP NIL     Reviewed and updated as needed this visit by clinical staff         Reviewed and updated as needed this visit by Provider        History reviewed. No pertinent past medical history.     Review of Systems   Constitutional: Negative for chills and fever.   HENT: Negative for congestion, ear pain, hearing loss and sore throat.    Eyes: Negative for pain and visual disturbance.   Respiratory: Negative for cough and shortness of breath.    Cardiovascular: Negative for chest pain, palpitations and peripheral edema.   Gastrointestinal: Negative for abdominal pain, constipation, diarrhea, heartburn, hematochezia and nausea.   Breasts:  Negative for tenderness, breast mass and discharge.   Genitourinary: Negative for dysuria, frequency, genital sores, hematuria, pelvic pain,  "urgency, vaginal bleeding and vaginal discharge.   Musculoskeletal: Negative for arthralgias, joint swelling and myalgias.   Skin: Negative for rash.   Neurological: Negative for dizziness, weakness, headaches and paresthesias.   Psychiatric/Behavioral: Negative for mood changes. The patient is not nervous/anxious.           OBJECTIVE:   /70   Pulse 94   Temp 97.7  F (36.5  C) (Oral)   Ht 1.74 m (5' 8.5\")   Wt 77.6 kg (171 lb)   LMP 09/28/2019   SpO2 99%   Breastfeeding? No   BMI 25.62 kg/m    Physical Exam  GENERAL: healthy, alert and no distress  EYES: Eyes grossly normal to inspection, PERRL and conjunctivae and sclerae normal  HENT: ear canals and TM's normal, nose and mouth without ulcers or lesions  NECK: no adenopathy, no asymmetry, masses, or scars and thyroid normal to palpation  RESP: lungs clear to auscultation - no rales, rhonchi or wheezes  BREAST: normal without masses, tenderness or nipple discharge and no palpable axillary masses or adenopathy  CV: regular rate and rhythm, normal S1 S2, no S3 or S4, no murmur, click or rub, no peripheral edema and peripheral pulses strong  ABDOMEN: soft, nontender, no hepatosplenomegaly, no masses and bowel sounds normal  MS: no gross musculoskeletal defects noted, no edema  SKIN: no suspicious lesions or rashes  NEURO: Normal strength and tone, mentation intact and speech normal  PSYCH: mentation appears normal, affect normal/bright    Diagnostic Test Results:  Labs reviewed in Epic    ASSESSMENT/PLAN:       ICD-10-CM    1. Routine general medical examination at a health care facility Z00.00 Basic metabolic panel     Lipid panel reflex to direct LDL Fasting   2. Encounter for initial prescription of contraceptive pills Z30.011 norgestimate-ethinyl estradiol (SPRINTEC 28) 0.25-35 MG-MCG tablet   3. Herpes simplex virus infection B00.9 valACYclovir (VALTREX) 500 MG tablet   4. Fungal infection B49 clotrimazole-betamethasone (LOTRISONE) 1-0.05 % external " "cream   5. Eczema, unspecified type L30.9 ketoconazole (NIZORAL) 2 % external cream       COUNSELING:  Reviewed preventive health counseling, as reflected in patient instructions       Regular exercise       Healthy diet/nutrition       Contraception    Estimated body mass index is 26.82 kg/m  as calculated from the following:    Height as of 7/3/18: 1.74 m (5' 8.5\").    Weight as of 7/3/18: 81.2 kg (179 lb).    Will get a baseline fasting lipid  Discussed other options for birth control, including LARCs  Advised scheduling with our midwives for IUD insertion       reports that she has never smoked. She has never used smokeless tobacco.      Counseling Resources:  ATP IV Guidelines  Pooled Cohorts Equation Calculator  Breast Cancer Risk Calculator  FRAX Risk Assessment  ICSI Preventive Guidelines  Dietary Guidelines for Americans, 2010  USDA's MyPlate  ASA Prophylaxis  Lung CA Screening    SHARRON Buenrostro [facility]  "

## 2019-10-21 NOTE — RESULT ENCOUNTER NOTE
Adina Clark,    Your lab results have been released to [facility].   Your labs look great!  Please call the clinic if you have any concerns [phone].    SHARRON Buenrostro [facility]

## 2019-11-02 ENCOUNTER — HEALTH MAINTENANCE LETTER (OUTPATIENT)
Age: 29
End: 2019-11-02

## 2020-10-21 NOTE — TELEPHONE ENCOUNTER
"Requested Prescriptions   Pending Prescriptions Disp Refills     SPRINTEC 28 0.25-35 MG-MCG tablet [Pharmacy Med Name: SPRINTEC 28 DAY TABLET] 84 tablet 3     Sig: TAKE 1 TABLET BY MOUTH EVERY DAY   Last Written Prescription Date:  7-3-18  Last Fill Quantity: 84,  # refills: 3   Last office visit: 7/3/2018 with prescribing provider:  7-3-18   Future Office Visit:        Contraceptives Protocol Passed - 6/6/2019  1:19 AM        Passed - Patient is not a current smoker if age is 35 or older        Passed - Recent (12 mo) or future (30 days) visit within the authorizing provider's specialty     Patient had office visit in the last 12 months or has a visit in the next 30 days with authorizing provider or within the authorizing provider's specialty.  See \"Patient Info\" tab in inbasket, or \"Choose Columns\" in Meds & Orders section of the refill encounter.              Passed - Medication is active on med list        Passed - No active pregnancy on record        Passed - No positive pregnancy test in past 12 months          " Family

## 2020-11-14 ENCOUNTER — HEALTH MAINTENANCE LETTER (OUTPATIENT)
Age: 30
End: 2020-11-14

## 2021-01-15 ENCOUNTER — HEALTH MAINTENANCE LETTER (OUTPATIENT)
Age: 31
End: 2021-01-15

## 2021-04-01 DIAGNOSIS — Z30.011 ENCOUNTER FOR INITIAL PRESCRIPTION OF CONTRACEPTIVE PILLS: ICD-10-CM

## 2021-04-02 ENCOUNTER — MYC MEDICAL ADVICE (OUTPATIENT)
Dept: PEDIATRICS | Facility: CLINIC | Age: 31
End: 2021-04-02

## 2021-04-02 RX ORDER — NORGESTIMATE AND ETHINYL ESTRADIOL 0.25-0.035
1 KIT ORAL DAILY
Qty: 84 TABLET | Refills: 0 | Status: SHIPPED | OUTPATIENT
Start: 2021-04-02 | End: 2021-06-29

## 2021-04-02 NOTE — TELEPHONE ENCOUNTER
Refill sent for 3 months. Schedule annual exam prior to next refill being due. She will be due for pap at that time as well

## 2021-06-28 ASSESSMENT — ENCOUNTER SYMPTOMS
ARTHRALGIAS: 0
WEAKNESS: 0
MYALGIAS: 0
FEVER: 0
NERVOUS/ANXIOUS: 0
ABDOMINAL PAIN: 0
DIZZINESS: 0
COUGH: 0
BREAST MASS: 0
EYE PAIN: 0
PALPITATIONS: 0
HEARTBURN: 0
PARESTHESIAS: 0
NAUSEA: 0
FREQUENCY: 1
DIARRHEA: 0
CHILLS: 0
JOINT SWELLING: 0
HEMATOCHEZIA: 0
CONSTIPATION: 0
SORE THROAT: 0
SHORTNESS OF BREATH: 0
HEADACHES: 0
HEMATURIA: 0
DYSURIA: 0

## 2021-07-01 ENCOUNTER — OFFICE VISIT (OUTPATIENT)
Dept: PEDIATRICS | Facility: CLINIC | Age: 31
End: 2021-07-01
Payer: COMMERCIAL

## 2021-07-01 VITALS
TEMPERATURE: 98.1 F | HEIGHT: 68 IN | OXYGEN SATURATION: 99 % | SYSTOLIC BLOOD PRESSURE: 118 MMHG | HEART RATE: 73 BPM | RESPIRATION RATE: 12 BRPM | DIASTOLIC BLOOD PRESSURE: 76 MMHG | WEIGHT: 191 LBS | BODY MASS INDEX: 28.95 KG/M2

## 2021-07-01 DIAGNOSIS — B00.9 HERPES SIMPLEX VIRUS INFECTION: ICD-10-CM

## 2021-07-01 DIAGNOSIS — Z12.4 SCREENING FOR MALIGNANT NEOPLASM OF CERVIX: ICD-10-CM

## 2021-07-01 DIAGNOSIS — Z00.00 ROUTINE GENERAL MEDICAL EXAMINATION AT A HEALTH CARE FACILITY: Primary | ICD-10-CM

## 2021-07-01 DIAGNOSIS — Z30.09 BIRTH CONTROL COUNSELING: ICD-10-CM

## 2021-07-01 DIAGNOSIS — L30.9 ECZEMA, UNSPECIFIED TYPE: ICD-10-CM

## 2021-07-01 PROCEDURE — G0145 SCR C/V CYTO,THINLAYER,RESCR: HCPCS | Performed by: NURSE PRACTITIONER

## 2021-07-01 PROCEDURE — 99395 PREV VISIT EST AGE 18-39: CPT | Performed by: NURSE PRACTITIONER

## 2021-07-01 RX ORDER — VALACYCLOVIR HYDROCHLORIDE 500 MG/1
500 TABLET, FILM COATED ORAL 2 TIMES DAILY
Qty: 6 TABLET | Refills: 3 | Status: SHIPPED | OUTPATIENT
Start: 2021-07-01

## 2021-07-01 RX ORDER — TRIAMCINOLONE ACETONIDE 1 MG/G
OINTMENT TOPICAL 2 TIMES DAILY
Qty: 80 G | Refills: 3 | Status: SHIPPED | OUTPATIENT
Start: 2021-07-01

## 2021-07-01 RX ORDER — ETONOGESTREL AND ETHINYL ESTRADIOL VAGINAL RING .015; .12 MG/D; MG/D
1 RING VAGINAL
Qty: 3 EACH | Refills: 4 | Status: SHIPPED | OUTPATIENT
Start: 2021-07-01 | End: 2022-07-18

## 2021-07-01 ASSESSMENT — ENCOUNTER SYMPTOMS
HEADACHES: 0
NAUSEA: 0
DIARRHEA: 0
PARESTHESIAS: 0
JOINT SWELLING: 0
FREQUENCY: 1
NERVOUS/ANXIOUS: 0
ABDOMINAL PAIN: 0
CHILLS: 0
PALPITATIONS: 0
ARTHRALGIAS: 0
HEMATOCHEZIA: 0
EYE PAIN: 0
CONSTIPATION: 0
SHORTNESS OF BREATH: 0
DIZZINESS: 0
FEVER: 0
HEMATURIA: 0
WEAKNESS: 0
COUGH: 0
BREAST MASS: 0
MYALGIAS: 0
HEARTBURN: 0
SORE THROAT: 0
DYSURIA: 0

## 2021-07-01 ASSESSMENT — MIFFLIN-ST. JEOR: SCORE: 1639.87

## 2021-07-01 NOTE — PROGRESS NOTES
SUBJECTIVE:   CC: Adina Clark is an 31 year old woman who presents for preventive health visit.     Patient has been advised of split billing requirements and indicates understanding: Yes  Healthy Habits:     Getting at least 3 servings of Calcium per day:  Yes    Bi-annual eye exam:  Yes    Dental care twice a year:  NO    Sleep apnea or symptoms of sleep apnea:  None    Diet:  Regular (no restrictions)    Frequency of exercise:  2-3 days/week    Duration of exercise:  15-30 minutes    Taking medications regularly:  Yes    Medication side effects:  None    PHQ-2 Total Score: 0    Additional concerns today:  No    Has been on the pill since she was 18  Doesn't want to take hormones anymore. Doesn't want an IUD  In monogamous relationship with partner of many years  Moved to [facility]  Works at a [employer]  In [employer] to be a     Today's PHQ-2 Score:   PHQ-2 ( 1999 Pfizer) 6/28/2021   Q1: Little interest or pleasure in doing things 0   Q2: Feeling down, depressed or hopeless 0   PHQ-2 Score 0   Q1: Little interest or pleasure in doing things Not at all   Q2: Feeling down, depressed or hopeless Not at all   PHQ-2 Score 0     Abuse: Current or Past (Physical, Sexual or Emotional) - No  Do you feel safe in your environment? Yes    Have you ever done Advance Care Planning? (For example, a Health Directive, POLST, or a discussion with a medical provider or your loved ones about your wishes): No, advance care planning information given to patient to review.  Patient declined advance care planning discussion at this time.    Social History     Tobacco Use     Smoking status: Never Smoker     Smokeless tobacco: Never Used   Substance Use Topics     Alcohol use: Yes     Comment: occ.     Alcohol Use 6/28/2021   Prescreen: >3 drinks/day or >7 drinks/week? No   Prescreen: >3 drinks/day or >7 drinks/week? -     Reviewed orders with patient.  Reviewed health maintenance and updated orders  accordingly - Yes  Lab work is in process  Labs reviewed in EPIC  BP Readings from Last 3 Encounters:   07/01/21 118/76   10/21/19 118/70   07/03/18 122/76    Wt Readings from Last 3 Encounters:   07/01/21 86.6 kg (191 lb)   10/21/19 77.6 kg (171 lb)   07/03/18 81.2 kg (179 lb)                  There is no problem list on file for this patient.    History reviewed. No pertinent surgical history.    Social History     Tobacco Use     Smoking status: Never Smoker     Smokeless tobacco: Never Used   Substance Use Topics     Alcohol use: Yes     Comment: occ.     Family History   Problem Relation Age of Onset     Hypertension Mother      Aneurysm Mother         brain aneurysm         Current Outpatient Medications   Medication Sig Dispense Refill     etonogestrel-ethinyl estradiol (NUVARING) 0.12-0.015 MG/24HR vaginal ring Place 1 each vaginally every 28 days 3 each 4     triamcinolone (KENALOG) 0.1 % external ointment Apply topically 2 times daily Apply to lesions on hands 80 g 3     valACYclovir (VALTREX) 500 MG tablet Take 1 tablet (500 mg) by mouth 2 times daily 6 tablet 3       Breast Cancer Screening:    FHS-7:   Breast CA Risk Assessment (FHS-7) 6/28/2021   Did any of your first-degree relatives have breast or ovarian cancer? No   Did any of your relatives have bilateral breast cancer? No   Did any man in your family have breast cancer? No   Did any woman in your family have breast and ovarian cancer? Yes   Did any woman in your family have breast cancer before age 50 y? No   Do you have 2 or more relatives with breast and/or ovarian cancer? Yes   Do you have 2 or more relatives with breast and/or bowel cancer? No     click delete button to remove this line now  Patient under 40 years of age: Routine Mammogram Screening not recommended.   Pertinent mammograms are reviewed under the imaging tab.    History of abnormal Pap smear: NO - age 30- 65 PAP every 3 years recommended  PAP / HPV 7/3/2018   PAP NIL  "    Reviewed and updated as needed this visit by clinical staff  Tobacco  Allergies    Med Hx  Surg Hx  Fam Hx  Soc Hx        Reviewed and updated as needed this visit by Provider                History reviewed. No pertinent past medical history.     Review of Systems   Constitutional: Negative for chills and fever.   HENT: Negative for congestion, ear pain, hearing loss and sore throat.    Eyes: Negative for pain and visual disturbance.   Respiratory: Negative for cough and shortness of breath.    Cardiovascular: Negative for chest pain, palpitations and peripheral edema.   Gastrointestinal: Negative for abdominal pain, constipation, diarrhea, heartburn, hematochezia and nausea.   Breasts:  Negative for tenderness, breast mass and discharge.   Genitourinary: Positive for frequency. Negative for dysuria, genital sores, hematuria, pelvic pain, urgency, vaginal bleeding and vaginal discharge.   Musculoskeletal: Negative for arthralgias, joint swelling and myalgias.   Skin: Negative for rash.   Neurological: Negative for dizziness, weakness, headaches and paresthesias.   Psychiatric/Behavioral: Positive for mood changes. The patient is not nervous/anxious.           OBJECTIVE:   /76 (BP Location: Right arm, Cuff Size: Adult Large)   Pulse 73   Temp 98.1  F (36.7  C) (Tympanic)   Resp 12   Ht 1.727 m (5' 8\")   Wt 86.6 kg (191 lb)   LMP 06/30/2021 (Exact Date)   SpO2 99%   BMI 29.04 kg/m    Physical Exam  GENERAL: healthy, alert and no distress  EYES: Eyes grossly normal to inspection, PERRL and conjunctivae and sclerae normal  HENT: ear canals and TM's normal, nose and mouth without ulcers or lesions  NECK: no adenopathy, no asymmetry, masses, or scars and thyroid normal to palpation  RESP: lungs clear to auscultation - no rales, rhonchi or wheezes  CV: regular rate and rhythm, normal S1 S2, no S3 or S4, no murmur, click or rub, no peripheral edema and peripheral pulses strong  ABDOMEN: soft, " "nontender, no hepatosplenomegaly, no masses and bowel sounds normal   (female): normal female external genitalia, normal urethral meatus, vaginal mucosa pink, moist, well rugated, and normal cervix/adnexa/uterus without masses or discharge  MS: no gross musculoskeletal defects noted, no edema  SKIN: no suspicious lesions or rashes  NEURO: Normal strength and tone, mentation intact and speech normal  PSYCH: mentation appears normal, affect normal/bright    Diagnostic Test Results:  Labs reviewed in Epic    ASSESSMENT/PLAN:   1. Routine general medical examination at a health care facility    2. Screening for malignant neoplasm of cervix  - Pap imaged thin layer screen reflex to HPV if ASCUS - recommend age 25 - 29    3. Herpes simplex virus infection  - valACYclovir (VALTREX) 500 MG tablet; Take 1 tablet (500 mg) by mouth 2 times daily  Dispense: 6 tablet; Refill: 3    4. Birth control counseling  - Discussed options for birth control, including non-hormonal, condoms and timing method. Wants to go a few months without hormones. May start NuvaRing in the future. Reviewed risks and benefits  - etonogestrel-ethinyl estradiol (NUVARING) 0.12-0.015 MG/24HR vaginal ring; Place 1 each vaginally every 28 days  Dispense: 3 each; Refill: 4    5. Eczema, unspecified type  - triamcinolone (KENALOG) 0.1 % external ointment; Apply topically 2 times daily Apply to lesions on hands  Dispense: 80 g; Refill: 3    Patient has been advised of split billing requirements and indicates understanding: Yes  COUNSELING:  Reviewed preventive health counseling, as reflected in patient instructions       Regular exercise       Healthy diet/nutrition    Estimated body mass index is 29.04 kg/m  as calculated from the following:    Height as of this encounter: 1.727 m (5' 8\").    Weight as of this encounter: 86.6 kg (191 lb).        She reports that she has never smoked. She has never used smokeless tobacco.      Counseling Resources:  ATP IV " Guidelines  Pooled Cohorts Equation Calculator  Breast Cancer Risk Calculator  BRCA-Related Cancer Risk Assessment: FHS-7 Tool  FRAX Risk Assessment  ICSI Preventive Guidelines  Dietary Guidelines for Americans, 2010  USDA's MyPlate  ASA Prophylaxis  Lung CA Screening    SHARRON Buenrostro CNP  [facility]

## 2021-07-01 NOTE — PATIENT INSTRUCTIONS
"I sent in a prescription for the Nuva Ring. You can call your pharmacy in the future if you want it filled    Think of tracking your cycle on an clay  You will notice your \"fertile window\" and may note egg-white discharge. This is during ovulation    You can schedule with our midwives in the future if you are interested in an IUD        Preventive Health Recommendations  Female Ages 26 - 39  Yearly exam:   See your health care provider every year in order to    Review health changes.     Discuss preventive care.      Review your medicines if you your doctor has prescribed any.    Until age 30: Get a Pap test every three years (more often if you have had an abnormal result).    After age 30: Talk to your doctor about whether you should have a Pap test every 3 years or have a Pap test with HPV screening every 5 years.   You do not need a Pap test if your uterus was removed (hysterectomy) and you have not had cancer.  You should be tested each year for STDs (sexually transmitted diseases), if you're at risk.   Talk to your provider about how often to have your cholesterol checked.  If you are at risk for diabetes, you should have a diabetes test (fasting glucose).  Shots: Get a flu shot each year. Get a tetanus shot every 10 years.   Nutrition:     Eat at least 5 servings of fruits and vegetables each day.    Eat whole-grain bread, whole-wheat pasta and brown rice instead of white grains and rice.    Get adequate Calcium and Vitamin D.     Lifestyle    Exercise at least 150 minutes a week (30 minutes a day, 5 days of the week). This will help you control your weight and prevent disease.    Limit alcohol to one drink per day.    No smoking.     Wear sunscreen to prevent skin cancer.    See your dentist every six months for an exam and cleaning.    "

## 2021-07-07 LAB
COPATH REPORT: NORMAL
PAP: NORMAL

## 2021-09-12 ENCOUNTER — HEALTH MAINTENANCE LETTER (OUTPATIENT)
Age: 31
End: 2021-09-12

## 2022-01-16 ENCOUNTER — MYC MEDICAL ADVICE (OUTPATIENT)
Dept: PEDIATRICS | Facility: CLINIC | Age: 32
End: 2022-01-16
Payer: COMMERCIAL

## 2022-01-16 DIAGNOSIS — Z30.09 BIRTH CONTROL COUNSELING: ICD-10-CM

## 2022-01-16 DIAGNOSIS — Z30.011 ENCOUNTER FOR INITIAL PRESCRIPTION OF CONTRACEPTIVE PILLS: ICD-10-CM

## 2022-01-16 DIAGNOSIS — L30.9 ECZEMA, UNSPECIFIED TYPE: ICD-10-CM

## 2022-01-17 RX ORDER — KETOCONAZOLE 20 MG/G
CREAM TOPICAL DAILY
Qty: 60 G | Refills: 1 | Status: SHIPPED | OUTPATIENT
Start: 2022-01-17

## 2022-01-17 RX ORDER — NORGESTIMATE AND ETHINYL ESTRADIOL 0.25-0.035
1 KIT ORAL DAILY
Qty: 28 TABLET | Refills: 0 | Status: SHIPPED | OUTPATIENT
Start: 2022-01-17 | End: 2022-02-15

## 2022-03-01 ENCOUNTER — HOSPITAL ENCOUNTER (EMERGENCY)
Facility: CLINIC | Age: 32
Discharge: HOME | End: 2022-03-01
Attending: EMERGENCY MEDICINE | Admitting: EMERGENCY MEDICINE
Payer: COMMERCIAL

## 2022-03-01 VITALS
OXYGEN SATURATION: 99 % | HEIGHT: 68 IN | RESPIRATION RATE: 14 BRPM | TEMPERATURE: 98.9 F | WEIGHT: 190 LBS | HEART RATE: 100 BPM | DIASTOLIC BLOOD PRESSURE: 85 MMHG | BODY MASS INDEX: 28.79 KG/M2 | SYSTOLIC BLOOD PRESSURE: 129 MMHG

## 2022-03-01 DIAGNOSIS — T78.40XA ALLERGIC REACTION, INITIAL ENCOUNTER: ICD-10-CM

## 2022-03-01 LAB
HOLD SPECIMEN: NORMAL
HOLD SPECIMEN: NORMAL

## 2022-03-01 PROCEDURE — 99285 EMERGENCY DEPT VISIT HI MDM: CPT | Mod: 25

## 2022-03-01 PROCEDURE — 250N000011 HC RX IP 250 OP 636: Performed by: EMERGENCY MEDICINE

## 2022-03-01 PROCEDURE — 250N000009 HC RX 250: Performed by: EMERGENCY MEDICINE

## 2022-03-01 PROCEDURE — 96374 THER/PROPH/DIAG INJ IV PUSH: CPT

## 2022-03-01 PROCEDURE — 96375 TX/PRO/DX INJ NEW DRUG ADDON: CPT

## 2022-03-01 RX ORDER — DIPHENHYDRAMINE HYDROCHLORIDE 50 MG/ML
50 INJECTION INTRAMUSCULAR; INTRAVENOUS ONCE
Status: COMPLETED | OUTPATIENT
Start: 2022-03-01 | End: 2022-03-01

## 2022-03-01 RX ORDER — PREDNISONE 20 MG/1
TABLET ORAL
Qty: 10 TABLET | Refills: 0 | Status: SHIPPED | OUTPATIENT
Start: 2022-03-01 | End: 2022-07-18

## 2022-03-01 RX ORDER — EPINEPHRINE 0.3 MG/.3ML
0.3 INJECTION SUBCUTANEOUS
Qty: 1 EACH | Refills: 0 | Status: SHIPPED | OUTPATIENT
Start: 2022-03-01

## 2022-03-01 RX ORDER — METHYLPREDNISOLONE SODIUM SUCCINATE 125 MG/2ML
125 INJECTION, POWDER, LYOPHILIZED, FOR SOLUTION INTRAMUSCULAR; INTRAVENOUS ONCE
Status: COMPLETED | OUTPATIENT
Start: 2022-03-01 | End: 2022-03-01

## 2022-03-01 RX ADMIN — METHYLPREDNISOLONE SODIUM SUCCINATE 125 MG: 125 INJECTION, POWDER, FOR SOLUTION INTRAMUSCULAR; INTRAVENOUS at 06:40

## 2022-03-01 RX ADMIN — DIPHENHYDRAMINE HYDROCHLORIDE 50 MG: 50 INJECTION, SOLUTION INTRAMUSCULAR; INTRAVENOUS at 06:40

## 2022-03-01 RX ADMIN — EPINEPHRINE 0.3 MG: 1 INJECTION INTRAMUSCULAR; INTRAVENOUS; SUBCUTANEOUS at 06:40

## 2022-03-01 RX ADMIN — FAMOTIDINE 40 MG: 10 INJECTION, SOLUTION INTRAVENOUS at 06:41

## 2022-03-01 ASSESSMENT — ENCOUNTER SYMPTOMS
SHORTNESS OF BREATH: 1
FACIAL SWELLING: 1
ALLERGIC/IMMUNOLOGIC COMMENTS: ALLERGIC REACTION

## 2022-03-01 NOTE — ED PROVIDER NOTES
"  History   Chief Complaint:  Allergic Reaction       HPI   Adina Clark is a 31 year old female who presents with an allergic reaction. The patient states that yesterday she developed a flushed face and hives, and as the day progressed her eyes began to swell. This morning she woke up with tightening in her throat as well as some shortness of breath. She last took benadryl at 2030 last night. She denies new medications, detergents, makeup products, or other known new exposures.  She also denies any nausea or vomiting or any other symptoms.    Review of Systems   HENT: Positive for facial swelling.    Respiratory: Positive for shortness of breath.    Skin: Positive for rash.   Allergic/Immunologic:        Allergic reaction   All other systems reviewed and are negative.      Allergies:  The patient does not have any allergies    Medications:  Nuvaring    Past Medical History:     No other significant past medical history.    Family History:    Mother: hypertension, brain aneurysm    Social History:  She is here with her significant other.    Physical Exam     Patient Vitals for the past 24 hrs:   BP Temp Temp src Pulse Resp SpO2 Height Weight   03/01/22 0828 129/85 -- -- -- -- -- -- --   03/01/22 0632 -- 98.9  F (37.2  C) Temporal -- -- 99 % 1.727 m (5' 8\") 86.2 kg (190 lb)   03/01/22 0631 (!) 135/91 -- -- 100 -- -- -- --   03/01/22 0630 -- -- -- 91 14 -- -- --       Physical Exam  Nursing note and vitals reviewed.  Constitutional:  Oriented to person, place, and time. Cooperative.   HENT:   Nose:    Nose normal.   Mouth/Throat:   No significant edema to the posterior oropharynx.   Eyes:    Conjunctivae normal and EOM are normal.      Pupils are equal, round, and reactive to light.   Neck:    Trachea normal.   Cardiovascular:  Normal rate, regular rhythm, normal heart sounds and normal pulses. No murmur heard.  Pulmonary/Chest:  Effort normal and breath sounds normal.   Abdominal:   Soft. Normal " appearance and bowel sounds are normal.      There is no tenderness.      There is no rebound and no CVA tenderness.   Musculoskeletal:  Extremities atraumatic x 4.   Lymphadenopathy:  No cervical adenopathy.   Neurological:   Alert and oriented to person, place, and time. Normal strength.      No cranial nerve deficit or sensory deficit. GCS eye subscore is 4. GCS verbal subscore is 5. GCS motor subscore is 6.   Skin:    Some edema and erythema to the entire face.   Psychiatric:   Normal mood and affect.      Emergency Department Course   Emergency Department Course:    Reviewed:  I reviewed nursing notes, vitals, past medical history and Care Everywhere    Assessments:  0629 I obtained history and examined the patient as noted above.    Interventions:  Medications   EPINEPHrine (ADRENALIN) kit 0.1 mg (0.3 mg Intramuscular Given 3/1/22 0640)   methylPREDNISolone sodium succinate (solu-MEDROL) injection 125 mg (125 mg Intravenous Given 3/1/22 0640)   diphenhydrAMINE (BENADRYL) injection 50 mg (50 mg Intravenous Given 3/1/22 0640)       Disposition:  The patient was discharged to home.     Impression & Plan   Medical Decision Making:  This is a 31-year-old female who came in for further evaluation of an allergic reaction.  She did not appear to have airway compromise, although she did feel like her throat was a little bit tight and she had some mild shortness of breath.  However she has a normal posterior oropharynx and her lungs are normal as well.  I felt it was reasonable to provide her Benadryl, Solu-Medrol, and Pepcid.  We also discussed proceeding with an IM injection of epinephrine, which I felt was reasonable, and she was subsequently provided that.  We watched her here for a couple of hours, and she had no worsening and she did in fact improved here.  She understands that we do not know what caused this at this time, and therefore I think it is reasonable for her to follow-up with an allergy clinic.  I will  send her home with a prescription for 5 more days of prednisone, and she understood that she may not need to take all of that.  She understands that she should reassess every morning to see if she has ongoing symptoms.  I am also providing her a prescription for an EpiPen, and she knows to use that if her symptoms worsen significantly.  She also knows to return here if her symptoms worsen significantly and if she uses the EpiPen.  I am providing her the contact information for an allergy clinic for follow-up.  She knows to also use Benadryl and Pepcid as needed.    Diagnosis:    ICD-10-CM    1. Allergic reaction, initial encounter  T78.40XA        Discharge Medications:  Discharge Medication List as of 3/1/2022  8:23 AM      START taking these medications    Details   EPINEPHrine (ANY BX GENERIC EQUIV) 0.3 MG/0.3ML injection 2-pack Inject 0.3 mLs (0.3 mg) into the muscle once as needed for anaphylaxis, Disp-1 each, R-0, Local Print      predniSONE (DELTASONE) 20 MG tablet Take two tablets (= 40mg) each day for 5 (five) days, Disp-10 tablet, R-0, Local Print             Scribe Disclosure:  I, Casper Cooley, am serving as a scribe at 6:40 AM on 3/1/2022 to document services personally performed by Herminio Bryan MD based on my observations and the provider's statements to me.         Herminio Bryan MD  03/01/22 1952

## 2022-03-01 NOTE — DISCHARGE INSTRUCTIONS
You should also take Benadryl 25 to 50 mg at a time every 4-6 hours as needed, and you may also take Pepcid twice a day as needed.    Discharge Instructions  Allergic Reaction    An allergic reaction can result in a rash, itching, swelling, watery eyes, or a runny nose. A serious reaction can cause swelling of your mouth or throat, or difficulty breathing (wheezing). The most serious allergy is called anaphylaxis, and can be life-threatening. Many allergies result in hives, also called urticaria.       An allergy happens when the body s natural defense system (immune system) overreacts to something. The thing that triggers your allergic reaction is called an allergen. The first time you are exposed to your allergen, you may not have any reaction, but the body makes a protein called an antibody. The antibody lets the body recognize and remember the allergen.  Every time you are exposed to your allergen you get more antibody and your reaction can be more severe.      Generally, every Emergency Department visit should have a follow-up clinic visit with either a primary or a specialty clinic/provider. Please follow-up as instructed by your emergency provider today.    Call 911 if you have:  Swelling of the lips, tongue or throat.  Hoarse voice, drooling or trouble breathing.  Chest pain or shortness of breath.  Fainting or unconsciousness.    What can I do to help myself?  If you know what caused your allergy, do not touch it, throw any of it away, and tell others not to have it around you. Wear a medical alert bracelet with a name of your allergen on it.  If you do not know what you are allergic to, keep a journal of everything that you are exposed to (foods, soaps, medicines, etc.). Take this with you when you follow up with your primary provider or specialist (Allergist). This may help determine what is causing the allergic reaction.  Take any medicines that are prescribed.  Antihistamines can decrease rash or  itching. You may use Benadryl  (diphenhydramine) for rash or itching according to package directions, or use a prescription antihistamine as recommended by your provider.  For significant allergic reactions, you may have been given a prescription for an epinephrine (adrenaline) auto injector. Carry this with you at all times! Use it if you are having any symptoms of anaphylaxis.  Do not be afraid to use it. Return to the Emergency Department if you use your auto injector, call 911 if it does not resolve the symptoms. It is only meant to buy time until you can get to the Emergency Department!  If you were given a prescription for medicine here today, be sure to read all of the information (including the package insert) that comes with your prescription.  This will include important information about the medicine, its side effects, and any warnings that you need to know about.  The pharmacist who fills the prescription can provide more information and answer questions you may have about the medicine.  If you have questions or concerns that the pharmacist cannot address, please call or return to the Emergency Department.   Remember that you can always come back to the Emergency Department if you are not able to see your regular provider in the amount of time listed above, if you get any new symptoms, or if there is anything that worries you.

## 2022-05-11 DIAGNOSIS — Z30.011 ENCOUNTER FOR INITIAL PRESCRIPTION OF CONTRACEPTIVE PILLS: ICD-10-CM

## 2022-05-12 RX ORDER — NORGESTIMATE AND ETHINYL ESTRADIOL 0.25-0.035
KIT ORAL
Qty: 84 TABLET | Refills: 0 | Status: SHIPPED | OUTPATIENT
Start: 2022-05-12 | End: 2022-07-01

## 2022-05-12 NOTE — TELEPHONE ENCOUNTER
Prescription approved per [facility] Refill Protocol.  Brayden CROCKER RN

## 2022-07-01 ENCOUNTER — TELEPHONE (OUTPATIENT)
Dept: PEDIATRICS | Facility: CLINIC | Age: 32
End: 2022-07-01

## 2022-07-01 DIAGNOSIS — Z30.011 ENCOUNTER FOR INITIAL PRESCRIPTION OF CONTRACEPTIVE PILLS: ICD-10-CM

## 2022-07-01 NOTE — TELEPHONE ENCOUNTER
Reason for Call:  Medication or medication refill:    Do you use a [facility]?  Name of the pharmacy and phone number for the current request:  [facility]  #[id number] - [address]    Name of the medication requested: Estarylla    Other request: none    Can we leave a detailed message on this number? YES    Phone number patient can be reached at: Home number on file [phone] (home)    Best Time: any     Call taken on 7/1/2022 at 8:20 AM by Yara Harper

## 2022-07-05 RX ORDER — NORGESTIMATE AND ETHINYL ESTRADIOL 0.25-0.035
1 KIT ORAL DAILY
Qty: 84 TABLET | Refills: 0 | Status: SHIPPED | OUTPATIENT
Start: 2022-07-05 | End: 2022-07-18

## 2022-07-05 NOTE — TELEPHONE ENCOUNTER
Prescription [facility] per [facility] Ref[facility].   AUGUSTUS refill. Needs appointment.  MA/ to assist with updating needed screenings and/or scheduling follow up appointment.   Estephania Wayne RN

## 2022-07-05 NOTE — TELEPHONE ENCOUNTER
The pt is aware and scheduled for her upcoming appointment.   Aleah Montes on 7/5/2022 at 2:22 PM

## 2022-07-11 ASSESSMENT — ENCOUNTER SYMPTOMS
COUGH: 0
EYE PAIN: 0
CONSTIPATION: 0
DIZZINESS: 0
SHORTNESS OF BREATH: 0
FEVER: 0
NAUSEA: 0
WEAKNESS: 0
HEMATURIA: 0
MYALGIAS: 0
ABDOMINAL PAIN: 0
CHILLS: 0
SORE THROAT: 0
NERVOUS/ANXIOUS: 0
HEADACHES: 1
HEARTBURN: 0
PALPITATIONS: 0
PARESTHESIAS: 0
DYSURIA: 0
BREAST MASS: 0
JOINT SWELLING: 0
HEMATOCHEZIA: 0
FREQUENCY: 0
ARTHRALGIAS: 0
DIARRHEA: 0

## 2022-07-18 ENCOUNTER — OFFICE VISIT (OUTPATIENT)
Dept: INTERNAL MEDICINE | Facility: CLINIC | Age: 32
End: 2022-07-18
Payer: COMMERCIAL

## 2022-07-18 VITALS
DIASTOLIC BLOOD PRESSURE: 70 MMHG | OXYGEN SATURATION: 98 % | BODY MASS INDEX: 28.88 KG/M2 | HEIGHT: 69 IN | HEART RATE: 89 BPM | TEMPERATURE: 98.1 F | RESPIRATION RATE: 16 BRPM | SYSTOLIC BLOOD PRESSURE: 119 MMHG | WEIGHT: 195 LBS

## 2022-07-18 DIAGNOSIS — Z00.00 ANNUAL PHYSICAL EXAM: Primary | ICD-10-CM

## 2022-07-18 DIAGNOSIS — E55.9 VITAMIN D DEFICIENCY: ICD-10-CM

## 2022-07-18 DIAGNOSIS — Z30.011 ENCOUNTER FOR INITIAL PRESCRIPTION OF CONTRACEPTIVE PILLS: ICD-10-CM

## 2022-07-18 DIAGNOSIS — Z13.220 LIPID SCREENING: ICD-10-CM

## 2022-07-18 DIAGNOSIS — L98.9 SKIN LESION: ICD-10-CM

## 2022-07-18 PROCEDURE — 99395 PREV VISIT EST AGE 18-39: CPT | Performed by: INTERNAL MEDICINE

## 2022-07-18 RX ORDER — NORGESTIMATE AND ETHINYL ESTRADIOL 0.25-0.035
1 KIT ORAL DAILY
Qty: 84 TABLET | Refills: 3 | Status: SHIPPED | OUTPATIENT
Start: 2022-07-18 | End: 2023-08-16

## 2022-07-18 ASSESSMENT — ENCOUNTER SYMPTOMS
COUGH: 0
ARTHRALGIAS: 0
DIARRHEA: 0
BREAST MASS: 0
MYALGIAS: 0
HEARTBURN: 0
DIZZINESS: 0
EYE PAIN: 0
FREQUENCY: 0
ABDOMINAL PAIN: 0
WEAKNESS: 0
NAUSEA: 0
SHORTNESS OF BREATH: 0
PARESTHESIAS: 0
CONSTIPATION: 0
HEMATOCHEZIA: 0
SORE THROAT: 0
NERVOUS/ANXIOUS: 0
FEVER: 0
HEADACHES: 1
PALPITATIONS: 0
JOINT SWELLING: 0
CHILLS: 0
DYSURIA: 0
HEMATURIA: 0

## 2022-07-18 NOTE — PROGRESS NOTES
SUBJECTIVE:   CC: Adina Clark is an 32 year old woman who presents for preventive health visit.     Is a healthy 32-year-old female with history of off HSV, eczema, on birth control, but is currently here for a physical.    She reports she had some topical allergy to beauty product and had to be on oral prednisone in March.  No history of food allergies.    She is currently on birth control.  Her periods are regular, blood pressure is normal, no family history of clots.    No concerns today.  She is up-to-date on Pap smear.    Patient has been advised of split billing requirements and indicates understanding: Yes  Healthy Habits:     Getting at least 3 servings of Calcium per day:  Yes    Bi-annual eye exam:  Yes    Dental care twice a year:  NO    Sleep apnea or symptoms of sleep apnea:  None    Diet:  Regular (no restrictions)    Frequency of exercise:  None    Taking medications regularly:  Yes    Medication side effects:  None    PHQ-2 Total Score: 0    Additional concerns today:  No    Today's PHQ-2 Score:   PHQ-2 ( 1999 Pfizer) 7/11/2022   Q1: Little interest or pleasure in doing things 0   Q2: Feeling down, depressed or hopeless 0   PHQ-2 Score 0   PHQ-2 Total Score (12-17 Years)- Positive if 3 or more points; Administer PHQ-A if positive -   Q1: Little interest or pleasure in doing things Not at all   Q2: Feeling down, depressed or hopeless Not at all   PHQ-2 Score 0       Abuse: Current or Past (Physical, Sexual or Emotional) - No  Do you feel safe in your environment? Yes        Social History     Tobacco Use     Smoking status: Never Smoker     Smokeless tobacco: Never Used   Substance Use Topics     Alcohol use: Yes     Comment: occ.       Alcohol Use 7/11/2022   Prescreen: >3 drinks/day or >7 drinks/week? No   Prescreen: >3 drinks/day or >7 drinks/week? -     Reviewed orders with patient.  Reviewed health maintenance and updated orders accordingly -yes   breast Cancer  "Screening:    FHS-7:   Breast CA Risk Assessment (FHS-7) 6/28/2021 7/11/2022   Did any of your first-degree relatives have breast or ovarian cancer? No No   Did any of your relatives have bilateral breast cancer? No No   Did any man in your family have breast cancer? No No   Did any woman in your family have breast and ovarian cancer? Yes No   Did any woman in your family have breast cancer before age 50 y? No No   Do you have 2 or more relatives with breast and/or ovarian cancer? Yes No   Do you have 2 or more relatives with breast and/or bowel cancer? No No     History of abnormal Pap smear: No  PAP / HPV 7/1/2021 7/3/2018   PAP (Historical) NIL NIL     Reviewed and updated as needed this visit by clinical staff     Meds                Reviewed and updated as needed this visit by Provider                     Review of Systems   Constitutional: Negative for chills and fever.   HENT: Negative for congestion, ear pain, hearing loss and sore throat.    Eyes: Negative for pain and visual disturbance.   Respiratory: Negative for cough and shortness of breath.    Cardiovascular: Negative for chest pain, palpitations and peripheral edema.   Gastrointestinal: Negative for abdominal pain, constipation, diarrhea, heartburn, hematochezia and nausea.   Breasts:  Negative for tenderness, breast mass and discharge.   Genitourinary: Negative for dysuria, frequency, genital sores, hematuria, pelvic pain, urgency, vaginal bleeding and vaginal discharge.   Musculoskeletal: Negative for arthralgias, joint swelling and myalgias.   Skin: Positive for rash.   Neurological: Positive for headaches. Negative for dizziness, weakness and paresthesias.   Psychiatric/Behavioral: Negative for mood changes. The patient is not nervous/anxious.         OBJECTIVE:   /70 (BP Location: Left arm, Patient Position: Sitting, Cuff Size: Adult Regular)   Pulse 89   Temp 98.1  F (36.7  C) (Tympanic)   Resp 16   Ht 1.753 m (5' 9\")   Wt 88.5 kg " (195 lb)   SpO2 98%   BMI 28.80 kg/m    Physical Exam  General: well appearing female, alert and oriented x3  EYES: Eyelids, conjunctiva, and sclera were normal. Pupils were normal.   HEAD, EARS, NOSE, MOUTH, AND THROAT: no cervical LAD, no thyromegaly or nodules appreciated. TMs are visualized and normal, oropharynx is clear.  RESPIRATORY: respirations non labored, CTA bl, no wheezes, rales, no forced expiratory wheezing.  CARDIOVASCULAR: Heart rate and rhythm were normal. No murmurs, rubs,gallops. There was no peripheral edema.   GASTROINTESTINAL: Positive bowel sounds, abdomen is soft, non tender, non distended.     MUSCULOSKELETAL: Muscle mass was normal for age. No joint synovitis or deformity.  LYMPHATIC: There were no enlarged nodes palpable.  SKIN/HAIR/NAILS: Skin color was normal.  No rashes.  Multiple moles noted, no suspicious moles on her back noted.  She does have several tattoos.  NEUROLOGIC: The patient was alert and oriented.  Speech was normal.  There is no facial asymmetry.   PSYCHIATRIC:  Mood and affect were normal.   Breast exam: No axilla lymphadenopathy, breast masses or skin changes appreciated      ASSESSMENT/PLAN:   Adina was seen today for physical, recheck medication and derm problem.    Diagnoses and all orders for this visit:    Annual physical exam  We will check fasting blood work and cholesterol as well as vitamin D levels.  She is up-to-date on Pap smear.  -     Basic metabolic panel  (Ca, Cl, CO2, Creat, Gluc, K, Na, BUN); Future    Skin lesion  Multiple moles and multiple tattoos, I recommended full body skin check by dermatology.  -     Adult Dermatology Referral; Future    Lipid screening  -     Lipid panel reflex to direct LDL Fasting; Future    Vitamin D deficiency  -     Vitamin D Deficiency; Future    Encounter for initial prescription of contraceptive pills  Refill was provided.  Blood pressure is normal, no family history of clots.  -     norgestimate-ethinyl estradiol  "(ESTARYLLA) 0.25-35 MG-MCG tablet; Take 1 tablet by mouth daily      Estimated body mass index is 28.89 kg/m  as calculated from the following:    Height as of 3/1/22: 1.727 m (5' 8\").    Weight as of 3/1/22: 86.2 kg (190 lb).    She reports that she has never smoked. She has never used smokeless tobacco.      Counseling Resources:  ATP IV Guidelines  Pooled Cohorts Equation Calculator  Breast Cancer Risk Calculator  BRCA-Related Cancer Risk Assessment: FHS-7 Tool  FRAX Risk Assessment  ICSI Preventive Guidelines  Dietary Guidelines for Americans, 2010  USDA's MyPlate  ASA Prophylaxis  Lung CA Screening    Elida Smith MD  [facility]  "

## 2022-11-19 ENCOUNTER — HEALTH MAINTENANCE LETTER (OUTPATIENT)
Age: 32
End: 2022-11-19

## 2023-08-16 DIAGNOSIS — Z30.011 ENCOUNTER FOR INITIAL PRESCRIPTION OF CONTRACEPTIVE PILLS: ICD-10-CM

## 2023-08-16 RX ORDER — NORGESTIMATE AND ETHINYL ESTRADIOL 0.25-0.035
1 KIT ORAL DAILY
Qty: 84 TABLET | Refills: 3 | Status: SHIPPED | OUTPATIENT
Start: 2023-08-16

## 2023-08-16 NOTE — TELEPHONE ENCOUNTER
"Routing refill request to provider for review/approval because:  Patient needs to be seen because it has been more than 1 year since last office visit.    Last Written Prescription Date:  7/18/2022  Last Fill Quantity: 84,  # refills: 3   Last office visit provider:  7/18/2022     Requested Prescriptions   Pending Prescriptions Disp Refills    MARYCARMEN 0.25-35 MG-MCG tablet [Pharmacy Med Name: MARYCARMEN TABLETS 28S] 84 tablet 3     Sig: TAKE 1 TABLET BY MOUTH DAILY       Contraceptives Protocol Failed - 8/16/2023  2:05 PM        Failed - Recent (12 mo) or future (30 days) visit within the authorizing provider's specialty     Patient has had an office visit with the authorizing provider or a provider within the authorizing providers department within the previous 12 mos or has a future within next 30 days. See \"Patient Info\" tab in inbasket, or \"Choose Columns\" in Meds & Orders section of the refill encounter.              Passed - Patient is not a current smoker if age is 35 or older        Passed - Medication is active on med list        Passed - No active pregnancy on record        Passed - No positive pregnancy test in past 12 months             Andrés House RN 08/16/23 2:05 PM  "

## 2023-09-10 ENCOUNTER — HEALTH MAINTENANCE LETTER (OUTPATIENT)
Age: 33
End: 2023-09-10

## 2024-05-29 DIAGNOSIS — M25.562 LEFT KNEE PAIN, UNSPECIFIED CHRONICITY: Primary | ICD-10-CM

## 2024-05-30 ENCOUNTER — PRE VISIT (OUTPATIENT)
Dept: ORTHOPEDICS | Facility: CLINIC | Age: 34
End: 2024-05-30

## 2024-05-30 ENCOUNTER — ANCILLARY PROCEDURE (OUTPATIENT)
Dept: GENERAL RADIOLOGY | Facility: CLINIC | Age: 34
End: 2024-05-30
Attending: PREVENTIVE MEDICINE
Payer: COMMERCIAL

## 2024-05-30 ENCOUNTER — OFFICE VISIT (OUTPATIENT)
Dept: ORTHOPEDICS | Facility: CLINIC | Age: 34
End: 2024-05-30
Payer: COMMERCIAL

## 2024-05-30 DIAGNOSIS — M25.562 LEFT KNEE PAIN, UNSPECIFIED CHRONICITY: ICD-10-CM

## 2024-05-30 DIAGNOSIS — S83.412A SPRAIN OF MEDIAL COLLATERAL LIGAMENT OF LEFT KNEE, INITIAL ENCOUNTER: ICD-10-CM

## 2024-05-30 DIAGNOSIS — M22.2X2 PATELLOFEMORAL PAIN SYNDROME OF LEFT KNEE: Primary | ICD-10-CM

## 2024-05-30 PROCEDURE — 99204 OFFICE O/P NEW MOD 45 MIN: CPT | Performed by: PREVENTIVE MEDICINE

## 2024-05-30 PROCEDURE — 73562 X-RAY EXAM OF KNEE 3: CPT | Mod: LT | Performed by: RADIOLOGY

## 2024-05-30 RX ORDER — MELOXICAM 15 MG/1
15 TABLET ORAL DAILY PRN
Qty: 30 TABLET | Refills: 0 | Status: SHIPPED | OUTPATIENT
Start: 2024-05-30

## 2024-05-30 SDOH — HEALTH STABILITY: PHYSICAL HEALTH: ON AVERAGE, HOW MANY DAYS PER WEEK DO YOU ENGAGE IN MODERATE TO STRENUOUS EXERCISE (LIKE A BRISK WALK)?: 3 DAYS

## 2024-05-30 SDOH — HEALTH STABILITY: PHYSICAL HEALTH: ON AVERAGE, HOW MANY MINUTES DO YOU ENGAGE IN EXERCISE AT THIS LEVEL?: 50 MIN

## 2024-05-30 NOTE — PROGRESS NOTES
HISTORY OF PRESENT ILLNESS  Ms. Clark is a pleasant 34 year old year old female who presents to clinic today with the following:  What problem are you here for? Left knee pain - hx of a menisectomy in 2007. No new specific injury but has noticed left sided pain, swelling, lack of extension recently     How long have you had this problem? 2 weeks     Have you had any recent imaging of this problem? Xrays/MRI/CT scans? New XR today 5/30/24    Have you had treatments for this problem in the past?  -Medications? Advil   -Physical therapy? None  -Injections? None  -Surgery? Menisectomy in 2007    How severe is this problem today? 0-10 scale? 2/10    How severe has this problem been at WORST in the past? 0-10 scale? 5/10    What do you think caused this problem? Walking around a lot in [place] 2 weeks ago after her wedding          MEDICAL HISTORY  There is no problem list on file for this patient.      Current Outpatient Medications   Medication Sig Dispense Refill    EPINEPHrine (ANY BX GENERIC EQUIV) 0.3 MG/0.3ML injection 2-pack Inject 0.3 mLs (0.3 mg) into the muscle once as needed for anaphylaxis 1 each 0    ketoconazole (NIZORAL) 2 % external cream Apply topically daily 60 g 1    MARYCARMEN 0.25-35 MG-MCG tablet TAKE 1 TABLET BY MOUTH DAILY 84 tablet 3    triamcinolone (KENALOG) 0.1 % external ointment Apply topically 2 times daily Apply to lesions on hands 80 g 3    valACYclovir (VALTREX) 500 MG tablet Take 1 tablet (500 mg) by mouth 2 times daily 6 tablet 3       No Known Allergies    Family History   Problem Relation Age of Onset    Hypertension Mother     Aneurysm Mother         brain aneurysm     Social History     Socioeconomic History    Marital status: Single   Tobacco Use    Smoking status: Never    Smokeless tobacco: Never   Substance and Sexual Activity    Alcohol use: Yes     Comment: occ.    Drug use: No    Sexual activity: Yes     Partners: Male     Birth control/protection: Pill     Social Determinants  of Health      [facility]    Financial Resource Strain   Food Insecurity: Not on File (2021)    Received from [facility]     Food Insecurity     Food: 0   Transportation Needs: Not on File (2021)    Received from [facility]     Transportation Needs     Transportation: 0   Physical Activity: Not on File (2021)    Received from [facility]     Physical Activity     Physical Activity: 0   Stress: Not on File (2021)    Received from [facility]     Stress     Stress: 0    [facility]    Social Connections   Housing Stability: Not on File (2021)    Received from [facility]     Housing Stability     Housin       Additional medical/Social/Surgical histories reviewed in [facility] and updated as appropriate.     REVIEW OF SYSTEMS (2024)  10 point ROS of systems including Constitutional, Eyes, Respiratory, Cardiovascular, Gastroenterology, Genitourinary, Integumentary, Musculoskeletal, Psychiatric, Allergic/Immunologic were all negative except for pertinent positives noted in my HPI.     PHYSICAL EXAM  VSS    General  - normal appearance, in no obvious distress  HEENT  - conjunctivae not injected, moist mucous membranes, normocephalic/atraumatic head, ears normal appearance, no lesions, mouth normal appearance, no scars, normal dentition and teeth present  CV  - normal popliteal pulse  Pulm  - normal respiratory pattern, non-labored  Musculoskeletal - left knee  - stance: normal gait without limp, no obvious leg length discrepancy, single-leg squat exhibits knee valgus, internal rotation of the hip, contralateral hip drop  - inspection: no swelling or effusion, normal muscle tone, normal bone and joint alignment, no obvious deformity  - palpation: no joint line tenderness, patellar tendon non-tender, tender medial patellar facet and mcl  - ROM: 120 degrees flexion limited by discomfort, 0 degrees extension, slightly painful, crepitus  with weight-bearing flexion  - strength: 5/5 in flexion, 5/5 in extension  - neuro: no sensory or motor deficit  - special tests:  (-) Lachman  (-) anterior drawer  (-) Derrick  (-) Thessaly  (-) varus at 0 and 30 degrees flexion  (-) valgus at 0 and 30 degrees flexion  (+) Michael s compression test  (+) patellar grind  (-) patellar apprehension  Neuro  - no sensory or motor deficit, grossly normal coordination, normal muscle tone  Skin  - no ecchymosis, erythema, warmth, or induration, no obvious rash  Psych  - interactive, appropriate, normal mood and affect   ASSESSMENT & PLAN  33 yo female with left knee patellofemoral pain, and mcl sprain    I independently reviewed the following imaging studies:  Left knee xray: shows some lateral compartment narrowing in left knee  Discussed considering injection if not improving in 2-3 weeks  Rx given for mobic PRN  Ice PRN  Knee sleeve recommended PRN  Given HEP  Followup in 2-3 weeks  Patient has been doing home exercise physical therapy program for this problem      Appropriate PPE was utilized for prevention of spread of Covid-19.  Layo Lazaro MD, CAQSM

## 2024-05-30 NOTE — LETTER
5/30/2024      RE: Adina Clark  [address]  [place] [address]     Dear Colleague,    Thank you for referring your patient, Adina Clark, to the [facility] [place]. Please see a copy of my visit note below.    HISTORY OF PRESENT ILLNESS  Ms. Clark is a pleasant 34 year old year old female who presents to clinic today with the following:  What problem are you here for? Left knee pain - hx of a menisectomy in 2007. No new specific injury but has noticed left sided pain, swelling, lack of extension recently     How long have you had this problem? 2 weeks     Have you had any recent imaging of this problem? Xrays/MRI/CT scans? New XR today 5/30/24    Have you had treatments for this problem in the past?  -Medications? Advil   -Physical therapy? None  -Injections? None  -Surgery? Menisectomy in 2007    How severe is this problem today? 0-10 scale? 2/10    How severe has this problem been at WORST in the past? 0-10 scale? 5/10    What do you think caused this problem? Walking around a lot in [place] 2 weeks ago after her wedding          MEDICAL HISTORY  There is no problem list on file for this patient.      Current Outpatient Medications   Medication Sig Dispense Refill     EPINEPHrine (ANY BX GENERIC EQUIV) 0.3 MG/0.3ML injection 2-pack Inject 0.3 mLs (0.3 mg) into the muscle once as needed for anaphylaxis 1 each 0     ketoconazole (NIZORAL) 2 % external cream Apply topically daily 60 g 1     MARYCARMEN 0.25-35 MG-MCG tablet TAKE 1 TABLET BY MOUTH DAILY 84 tablet 3     triamcinolone (KENALOG) 0.1 % external ointment Apply topically 2 times daily Apply to lesions on hands 80 g 3     valACYclovir (VALTREX) 500 MG tablet Take 1 tablet (500 mg) by mouth 2 times daily 6 tablet 3       No Known Allergies    Family History   Problem Relation Age of Onset     Hypertension Mother      Aneurysm Mother         brain aneurysm     Social History     Socioeconomic  History     Marital status: Single   Tobacco Use     Smoking status: Never     Smokeless tobacco: Never   Substance and Sexual Activity     Alcohol use: Yes     Comment: occ.     Drug use: No     Sexual activity: Yes     Partners: Male     Birth control/protection: Pill     Social Determinants of Health      [facility]    Financial Resource Strain   Food Insecurity: Not on File (2021)    Received from [facility]     Food Insecurity      Food: 0   Transportation Needs: Not on File (2021)    Received from [facility]     Transportation Needs      Transportation: 0   Physical Activity: Not on File (2021)    Received from [facility]     Physical Activity      Physical Activity: 0   Stress: Not on File (2021)    Received from [facility]     Stress      Stress: 0    [facility] from [facility]    Social Connections   Housing Stability: Not on File (2021)    Received from [facility]     Housing Stability      Housin       Additional medical/Social/Surgical histories reviewed in [facility] and updated as appropriate.     REVIEW OF SYSTEMS (2024)  10 point ROS of systems including Constitutional, Eyes, Respiratory, Cardiovascular, Gastroenterology, Genitourinary, Integumentary, Musculoskeletal, Psychiatric, Allergic/Immunologic were all negative except for pertinent positives noted in my HPI.     PHYSICAL EXAM  VSS    General  - normal appearance, in no obvious distress  HEENT  - conjunctivae not injected, moist mucous membranes, normocephalic/atraumatic head, ears normal appearance, no lesions, mouth normal appearance, no scars, normal dentition and teeth present  CV  - normal popliteal pulse  Pulm  - normal respiratory pattern, non-labored  Musculoskeletal - left knee  - stance: normal gait without limp, no obvious leg length discrepancy, single-leg squat exhibits knee valgus, internal rotation of the hip, contralateral hip drop  - inspection: no  swelling or effusion, normal muscle tone, normal bone and joint alignment, no obvious deformity  - palpation: no joint line tenderness, patellar tendon non-tender, tender medial patellar facet and mcl  - ROM: 120 degrees flexion limited by discomfort, 0 degrees extension, slightly painful, crepitus with weight-bearing flexion  - strength: 5/5 in flexion, 5/5 in extension  - neuro: no sensory or motor deficit  - special tests:  (-) Lachman  (-) anterior drawer  (-) Derrick  (-) Thessaly  (-) varus at 0 and 30 degrees flexion  (-) valgus at 0 and 30 degrees flexion  (+) Michael s compression test  (+) patellar grind  (-) patellar apprehension  Neuro  - no sensory or motor deficit, grossly normal coordination, normal muscle tone  Skin  - no ecchymosis, erythema, warmth, or induration, no obvious rash  Psych  - interactive, appropriate, normal mood and affect   ASSESSMENT & PLAN  35 yo female with left knee patellofemoral pain, and mcl sprain    I independently reviewed the following imaging studies:  Left knee xray: shows some lateral compartment narrowing in left knee  Discussed considering injection if not improving in 2-3 weeks  Rx given for mobic PRN  Ice PRN  Knee sleeve recommended PRN  Given HEP  Followup in 2-3 weeks  Patient has been doing home exercise physical therapy program for this problem      Appropriate PPE was utilized for prevention of spread of Covid-19.  Layo Lazaro MD, [facility]        Again, thank you for allowing me to participate in the care of your patient.      Sincerely,    Layo Lazaro MD

## 2024-05-30 NOTE — PATIENT INSTRUCTIONS
General Tips    If you find that these exercises are too difficult or too painful, slowing the exercise down or reducing the range of motion (distance traveled during the exercise) may help immediately. If not, reducing the number of repetitions or sets is the next step.  Many exercises will feel awkward and may cause mild aches and pains in the beginning. This should subside with time, so keep it up.   The form described for each exercise is not one size fits all. There will be small variations that everyone has to make. It will take time to figure out what works best for you.  As the exercises get easier, add range of motion, sets, or repetitions. The goal is to progressively increase the total volume done each week!   Perform each set of these exercises to a RPE (rate of perceived exertion) of 7-8. This is why we have repetition ranges listed. If the exercise calls for 10-15 repetitions and you would rate your RPE at a 7-8 at rep 10, you can conclude the set.           Single Leg Eccentric Box Squat: With a chair/box/bed behind you and your feet shoulder width apart, straighten your non-affected leg and lift it forward off the ground. Slowly sit your hips back a few inches and drop your hips straight down and sit down onto the chair. Once onto the chair place your non-affected foot back on the ground and stand up using both legs. Perform this movement in a slow and controlled manner throughout.     3 sets of 8-10 repetitions.  1-2 minutes of rest between sets.           Static Lunge: Stand with 1 foot a step length in front of the other. Place your hands on your hips or stand next to a wall or stable structure to stabilize yourself. Lower your back knee down to the ground. Once you are down on one knee, adjust your legs so both knees are bent to 90 degrees. Using your back leg as a stabilizer, push though your front leg until you are standing with both legs straight. Reverse this motion and lightly tap your knee  on the ground. Repeat on the opposite leg. If this exercise is too easy with bodyweight, you may hold onto dumbbells in each hand, or place a barbell on your shoulders to increase the resistance.    3 sets of 8-10 repetitions.  1-2 minutes of rest between sets.             Single Leg Balance:  front of a chair or stable structure to grab if needed. With bare feet, lift your non-affected knee forward and balance on the affected leg. Try to spread your toes out to   the floor  so you have a stable base. If you feel you may fall, grab the chair in front of you to regain your balance.     3 sets of 30 seconds each leg.  1-2 minutes of rest between sets.

## 2024-05-30 NOTE — TELEPHONE ENCOUNTER
Action 5.29.24 jm   Action Taken Faxed imaging request to [facility] [place] [facility].       DIAGNOSIS: I tore my meniscus in my left knee in 2017 and had surgery. For the last week and a half, my knee has been incredibly sore - hard to put weight on and swollen. It's slowly getting better but it still isn't back to normal yet.     APPOINTMENT DATE: 5.30.24   NOTES STATUS DETAILS   DISCHARGE REPORT from the ER CE 1.21.17  Paola  [facility]   MEDICATION LIST Internal    XRAYS (IMAGES & REPORTS) In process *sched* for 5.30.24  XR Knee Left    1.21.17- requested from [facility] [place] [facility]  XR Knee Left

## 2024-11-02 ENCOUNTER — HEALTH MAINTENANCE LETTER (OUTPATIENT)
Age: 34
End: 2024-11-02